# Patient Record
Sex: MALE | Race: WHITE | Employment: OTHER | URBAN - METROPOLITAN AREA
[De-identification: names, ages, dates, MRNs, and addresses within clinical notes are randomized per-mention and may not be internally consistent; named-entity substitution may affect disease eponyms.]

---

## 2017-03-18 PROBLEM — E11.9 DMII (DIABETES MELLITUS, TYPE 2) (HCC): Status: ACTIVE | Noted: 2017-03-18

## 2017-03-18 PROBLEM — G47.33 OSA (OBSTRUCTIVE SLEEP APNEA): Status: ACTIVE | Noted: 2017-03-18

## 2017-03-18 PROBLEM — R06.02 SOB (SHORTNESS OF BREATH): Status: ACTIVE | Noted: 2017-03-18

## 2017-03-18 PROBLEM — R77.8 ELEVATED TROPONIN: Status: ACTIVE | Noted: 2017-03-18

## 2017-03-19 PROBLEM — J44.1 COPD EXACERBATION (HCC): Status: ACTIVE | Noted: 2017-03-19

## 2017-03-19 PROBLEM — R07.2 PRECORDIAL PAIN: Status: ACTIVE | Noted: 2017-03-19

## 2017-03-19 PROBLEM — G47.33 OSA TREATED WITH BIPAP: Status: ACTIVE | Noted: 2017-03-19

## 2017-03-20 PROBLEM — R07.2 PRECORDIAL PAIN: Status: RESOLVED | Noted: 2017-03-19 | Resolved: 2017-03-20

## 2017-03-22 ENCOUNTER — TELEPHONE (OUTPATIENT)
Dept: CARDIOLOGY CLINIC | Age: 57
End: 2017-03-22

## 2017-03-23 ENCOUNTER — TELEPHONE (OUTPATIENT)
Dept: CARDIOLOGY CLINIC | Age: 57
End: 2017-03-23

## 2017-03-31 ENCOUNTER — OFFICE VISIT (OUTPATIENT)
Dept: CARDIOLOGY CLINIC | Age: 57
End: 2017-03-31

## 2017-03-31 VITALS
OXYGEN SATURATION: 93 % | HEART RATE: 103 BPM | WEIGHT: 315 LBS | SYSTOLIC BLOOD PRESSURE: 110 MMHG | BODY MASS INDEX: 46.65 KG/M2 | DIASTOLIC BLOOD PRESSURE: 80 MMHG | HEIGHT: 69 IN

## 2017-03-31 DIAGNOSIS — E66.01 MORBID OBESITY WITH BMI OF 60.0-69.9, ADULT (HCC): ICD-10-CM

## 2017-03-31 DIAGNOSIS — R06.02 SOB (SHORTNESS OF BREATH): ICD-10-CM

## 2017-03-31 DIAGNOSIS — I10 ESSENTIAL HYPERTENSION: ICD-10-CM

## 2017-03-31 DIAGNOSIS — I25.10 CORONARY ARTERY DISEASE INVOLVING NATIVE CORONARY ARTERY OF NATIVE HEART WITHOUT ANGINA PECTORIS: Primary | ICD-10-CM

## 2017-03-31 DIAGNOSIS — I50.32 CHRONIC DIASTOLIC HF (HEART FAILURE) (HCC): ICD-10-CM

## 2017-03-31 DIAGNOSIS — I48.20 CHRONIC A-FIB (HCC): ICD-10-CM

## 2017-03-31 DIAGNOSIS — I25.2 HX OF NON-ST ELEVATION MYOCARDIAL INFARCTION (NSTEMI): ICD-10-CM

## 2017-03-31 PROCEDURE — 1036F TOBACCO NON-USER: CPT | Performed by: INTERNAL MEDICINE

## 2017-03-31 PROCEDURE — 3017F COLORECTAL CA SCREEN DOC REV: CPT | Performed by: INTERNAL MEDICINE

## 2017-03-31 PROCEDURE — G8427 DOCREV CUR MEDS BY ELIG CLIN: HCPCS | Performed by: INTERNAL MEDICINE

## 2017-03-31 PROCEDURE — G8598 ASA/ANTIPLAT THER USED: HCPCS | Performed by: INTERNAL MEDICINE

## 2017-03-31 PROCEDURE — 93000 ELECTROCARDIOGRAM COMPLETE: CPT | Performed by: INTERNAL MEDICINE

## 2017-03-31 PROCEDURE — 99214 OFFICE O/P EST MOD 30 MIN: CPT | Performed by: INTERNAL MEDICINE

## 2017-03-31 PROCEDURE — 1111F DSCHRG MED/CURRENT MED MERGE: CPT | Performed by: INTERNAL MEDICINE

## 2017-03-31 PROCEDURE — G8484 FLU IMMUNIZE NO ADMIN: HCPCS | Performed by: INTERNAL MEDICINE

## 2017-03-31 PROCEDURE — G8417 CALC BMI ABV UP PARAM F/U: HCPCS | Performed by: INTERNAL MEDICINE

## 2017-06-02 ENCOUNTER — OFFICE VISIT (OUTPATIENT)
Dept: CARDIOLOGY CLINIC | Age: 57
End: 2017-06-02

## 2017-06-02 VITALS
DIASTOLIC BLOOD PRESSURE: 70 MMHG | OXYGEN SATURATION: 93 % | HEART RATE: 90 BPM | BODY MASS INDEX: 46.65 KG/M2 | WEIGHT: 315 LBS | SYSTOLIC BLOOD PRESSURE: 120 MMHG | HEIGHT: 69 IN

## 2017-06-02 DIAGNOSIS — I50.32 CHRONIC DIASTOLIC HEART FAILURE (HCC): ICD-10-CM

## 2017-06-02 DIAGNOSIS — I25.10 CORONARY ARTERY DISEASE INVOLVING NATIVE CORONARY ARTERY OF NATIVE HEART WITHOUT ANGINA PECTORIS: Chronic | ICD-10-CM

## 2017-06-02 DIAGNOSIS — I48.20 CHRONIC ATRIAL FIBRILLATION (HCC): Primary | ICD-10-CM

## 2017-06-02 DIAGNOSIS — E66.01 MORBID OBESITY WITH BMI OF 60.0-69.9, ADULT (HCC): Chronic | ICD-10-CM

## 2017-06-02 DIAGNOSIS — I10 ESSENTIAL HYPERTENSION: ICD-10-CM

## 2017-06-02 PROCEDURE — 99214 OFFICE O/P EST MOD 30 MIN: CPT | Performed by: INTERNAL MEDICINE

## 2017-06-02 PROCEDURE — G8598 ASA/ANTIPLAT THER USED: HCPCS | Performed by: INTERNAL MEDICINE

## 2017-06-02 PROCEDURE — G8427 DOCREV CUR MEDS BY ELIG CLIN: HCPCS | Performed by: INTERNAL MEDICINE

## 2017-06-02 PROCEDURE — 3017F COLORECTAL CA SCREEN DOC REV: CPT | Performed by: INTERNAL MEDICINE

## 2017-06-02 PROCEDURE — 1036F TOBACCO NON-USER: CPT | Performed by: INTERNAL MEDICINE

## 2017-06-02 PROCEDURE — G8417 CALC BMI ABV UP PARAM F/U: HCPCS | Performed by: INTERNAL MEDICINE

## 2017-06-02 RX ORDER — ATORVASTATIN CALCIUM 40 MG/1
40 TABLET, FILM COATED ORAL DAILY
Qty: 30 TABLET | Refills: 6 | Status: SHIPPED | OUTPATIENT
Start: 2017-06-02 | End: 2018-01-01

## 2018-01-01 ENCOUNTER — OFFICE VISIT (OUTPATIENT)
Dept: CARDIOLOGY CLINIC | Age: 58
End: 2018-01-01

## 2018-01-01 VITALS
DIASTOLIC BLOOD PRESSURE: 78 MMHG | SYSTOLIC BLOOD PRESSURE: 122 MMHG | BODY MASS INDEX: 44.1 KG/M2 | WEIGHT: 315 LBS | OXYGEN SATURATION: 90 % | HEIGHT: 71 IN | HEART RATE: 78 BPM

## 2018-01-01 DIAGNOSIS — E66.01 MORBID OBESITY WITH BMI OF 50.0-59.9, ADULT (HCC): ICD-10-CM

## 2018-01-01 DIAGNOSIS — I48.20 CHRONIC ATRIAL FIBRILLATION (HCC): ICD-10-CM

## 2018-01-01 DIAGNOSIS — I50.32 CHRONIC DIASTOLIC HEART FAILURE (HCC): Chronic | ICD-10-CM

## 2018-01-01 DIAGNOSIS — I10 ESSENTIAL HYPERTENSION: ICD-10-CM

## 2018-01-01 DIAGNOSIS — I25.10 CORONARY ARTERY DISEASE INVOLVING NATIVE CORONARY ARTERY OF NATIVE HEART WITHOUT ANGINA PECTORIS: Primary | Chronic | ICD-10-CM

## 2018-01-01 PROCEDURE — 1036F TOBACCO NON-USER: CPT | Performed by: INTERNAL MEDICINE

## 2018-01-01 PROCEDURE — 99214 OFFICE O/P EST MOD 30 MIN: CPT | Performed by: INTERNAL MEDICINE

## 2018-01-01 PROCEDURE — G8598 ASA/ANTIPLAT THER USED: HCPCS | Performed by: INTERNAL MEDICINE

## 2018-01-01 PROCEDURE — 3017F COLORECTAL CA SCREEN DOC REV: CPT | Performed by: INTERNAL MEDICINE

## 2018-01-01 PROCEDURE — G8428 CUR MEDS NOT DOCUMENT: HCPCS | Performed by: INTERNAL MEDICINE

## 2018-01-01 PROCEDURE — G8417 CALC BMI ABV UP PARAM F/U: HCPCS | Performed by: INTERNAL MEDICINE

## 2018-01-01 RX ORDER — GABAPENTIN 300 MG/1
100 CAPSULE ORAL NIGHTLY PRN
Status: ON HOLD | COMMUNITY
End: 2019-01-01 | Stop reason: HOSPADM

## 2018-01-01 RX ORDER — IPRATROPIUM BROMIDE AND ALBUTEROL SULFATE 2.5; .5 MG/3ML; MG/3ML
1 SOLUTION RESPIRATORY (INHALATION) EVERY 4 HOURS
COMMUNITY

## 2018-01-01 RX ORDER — TORSEMIDE 100 MG/1
100 TABLET ORAL DAILY
COMMUNITY
End: 2019-01-01

## 2018-01-01 RX ORDER — VITAMIN B COMPLEX
1 CAPSULE ORAL DAILY
COMMUNITY
End: 2019-01-01

## 2018-01-01 RX ORDER — DILTIAZEM HYDROCHLORIDE 240 MG/1
240 CAPSULE, EXTENDED RELEASE ORAL DAILY
COMMUNITY
End: 2019-01-01

## 2018-01-01 RX ORDER — ATORVASTATIN CALCIUM 40 MG/1
40 TABLET, FILM COATED ORAL DAILY
Qty: 90 TABLET | Refills: 3 | Status: ON HOLD | OUTPATIENT
Start: 2018-01-01 | End: 2019-01-01 | Stop reason: HOSPADM

## 2018-05-29 RX ORDER — ATORVASTATIN CALCIUM 40 MG/1
TABLET, FILM COATED ORAL
Qty: 30 TABLET | Refills: 6 | OUTPATIENT
Start: 2018-05-29

## 2018-08-29 NOTE — PROGRESS NOTES
release capsule Take 240 mg by mouth daily      b complex vitamins capsule Take 1 capsule by mouth daily      ipratropium-albuterol (DUONEB) 0.5-2.5 (3) MG/3ML SOLN nebulizer solution Inhale 1 vial into the lungs every 4 hours      gabapentin (NEURONTIN) 300 MG capsule Take 300 mg by mouth nightly as needed. Maylon Newman aspirin 81 MG EC tablet Take 1 tablet by mouth daily 30 tablet 3    digoxin (LANOXIN) 125 MCG tablet Take 125 mcg by mouth daily      rivaroxaban (XARELTO) 20 MG TABS tablet Take 20 mg by mouth daily      budesonide-formoterol (SYMBICORT) 160-4.5 MCG/ACT AERO Inhale 2 puffs into the lungs 2 times daily      albuterol (PROVENTIL HFA;VENTOLIN HFA) 108 (90 BASE) MCG/ACT inhaler Inhale 2 puffs into the lungs every 4 hours as needed for Wheezing.  allopurinol (ZYLOPRIM) 300 MG tablet Take 300 mg by mouth daily.  metFORMIN (GLUCOPHAGE) 500 MG tablet Take 500 mg by mouth daily (with breakfast). No current facility-administered medications for this visit. Review of Systems:  · Constitutional: no unanticipated weight loss. There's been no change in energy level, sleep pattern, or activity level. No fevers, chills. · Eyes: No visual changes or diplopia. No scleral icterus. · ENT: No Headaches, hearing loss or vertigo. No mouth sores or sore throat. · Cardiovascular: as reviewed in HPI  · Respiratory: No cough or wheezing, no sputum production. No hematemesis. · Gastrointestinal: No abdominal pain, appetite loss, blood in stools. No change in bowel or bladder habits. +obesity  · Genitourinary: No dysuria, trouble voiding, or hematuria. · Musculoskeletal:  No gait disturbance, no joint complaints. · Integumentary: No rash or pruritis. · Neurological: No headache, diplopia, change in muscle strength, numbness or tingling. · Psychiatric: No anxiety or depression. · Endocrine: No temperature intolerance. No excessive thirst, fluid intake, or urination.  No

## 2018-09-07 NOTE — COMMUNICATION BODY
Assessment:       Plan:     HPI:  The patient is 62 y.o. male with a past medical history significant for CAD s/p RAMANA to LAD, morbid obesity, ELODIA, atrial fibrillation, fall, COPD, and diastolic heart failure presents for chronic management of his CAD. He was originally seen during hospitalization 3/2017 when he was admitted for shortness of breath and chest tightness. He underwent angiography as he also had a troponin leak at 0.16. He had severe LAD stenosis and underwent placement of 2 RAMANA. Today he is here with his wife. He is using a wheelchair. He denies chest pain, worsening dyspnea, worsening leg swelling, lightheadedness, syncope and palpitations. He reports compliance with CPAP and his medications. He denies unusual bleeding. He was on a statin \"holiday\" to see if it improved his leg weakness but he denies any changes. Assessment/Plan:     1) CAD s/p 2 RAMANA to LAD after NSTEMI. Continue with medical management and risk factor modification including aspirin. Plavix stopped with >1 year of therapy. Advised to resume atorvastatin 40mg daily. 2) Chronic atrial fibrillation. Continue Xarelto. Rate controlled currently with CCB. Will stop digoxin. Patient and pt will monitor pulse and call if heart rate consistently >100. Recommend increasing CCB dosage for rate control if needed.    3) Dyspnea. Etiology multifactorial with morbid obesity, deconditioning, and diastolic heart failure. 4) Chronic diastolic heart failure. LVEDP elevated >30 mmHg on angiogram. Continue po diuretics.    5) Essential hypertension. Controlled. Goal BP <140/90 with DM. Continue medical therapy.    6) Morbid obesity. BMI 58. Encouraged weight loss. Discussed that his weight increases his risk of complications.      Follow up in 6 months. Thank you very much for allowing me to participate in the care of your patient. Please do not hesitate to contact me if you have any questions. Sincerely,  Helen Cheung.  Dameon MD [facility], [address] [place] [address]  Ph: [phone]  Fax: [phone]    Physician Attestation: The scribes documentation has been prepared under my direction and personally reviewed by me it its entirety. I confirm that the note above accurately reflects all work, treatment, procedures, and medical decision making performed by me.

## 2018-09-26 PROBLEM — R77.8 ELEVATED TROPONIN: Status: RESOLVED | Noted: 2017-03-18 | Resolved: 2018-01-01

## 2019-01-01 ENCOUNTER — APPOINTMENT (OUTPATIENT)
Dept: GENERAL RADIOLOGY | Age: 59
DRG: 870 | End: 2019-01-01
Payer: MEDICARE

## 2019-01-01 ENCOUNTER — PREP FOR PROCEDURE (OUTPATIENT)
Dept: OPHTHALMOLOGY | Age: 59
End: 2019-01-01

## 2019-01-01 ENCOUNTER — HOSPITAL ENCOUNTER (INPATIENT)
Age: 59
LOS: 13 days | Discharge: HOSPICE | DRG: 870 | End: 2019-06-16
Attending: EMERGENCY MEDICINE | Admitting: INTERNAL MEDICINE
Payer: MEDICARE

## 2019-01-01 VITALS
HEART RATE: 112 BPM | TEMPERATURE: 97.3 F | SYSTOLIC BLOOD PRESSURE: 96 MMHG | HEIGHT: 69 IN | OXYGEN SATURATION: 95 % | WEIGHT: 315 LBS | BODY MASS INDEX: 46.65 KG/M2 | RESPIRATION RATE: 18 BRPM | DIASTOLIC BLOOD PRESSURE: 65 MMHG

## 2019-01-01 DIAGNOSIS — R65.21 SEPTIC SHOCK (HCC): Primary | ICD-10-CM

## 2019-01-01 DIAGNOSIS — E87.20 LACTIC ACIDOSIS: ICD-10-CM

## 2019-01-01 DIAGNOSIS — A41.9 SEPTIC SHOCK (HCC): Primary | ICD-10-CM

## 2019-01-01 LAB
A/G RATIO: 0.5 (ref 1.1–2.2)
A/G RATIO: 0.6 (ref 1.1–2.2)
A/G RATIO: 0.7 (ref 1.1–2.2)
A/G RATIO: 0.7 (ref 1.1–2.2)
A/G RATIO: 0.8 (ref 1.1–2.2)
ABO/RH: NORMAL
ALBUMIN SERPL-MCNC: 2.2 G/DL (ref 3.4–5)
ALBUMIN SERPL-MCNC: 2.3 G/DL (ref 3.4–5)
ALBUMIN SERPL-MCNC: 2.4 G/DL (ref 3.4–5)
ALBUMIN SERPL-MCNC: 2.4 G/DL (ref 3.4–5)
ALBUMIN SERPL-MCNC: 2.5 G/DL (ref 3.4–5)
ALBUMIN SERPL-MCNC: 2.5 G/DL (ref 3.4–5)
ALBUMIN SERPL-MCNC: 2.6 G/DL (ref 3.4–5)
ALBUMIN SERPL-MCNC: 2.7 G/DL (ref 3.4–5)
ALBUMIN SERPL-MCNC: 2.8 G/DL (ref 3.4–5)
ALBUMIN SERPL-MCNC: 2.9 G/DL (ref 3.4–5)
ALBUMIN SERPL-MCNC: 3 G/DL (ref 3.4–5)
ALP BLD-CCNC: 212 U/L (ref 40–129)
ALP BLD-CCNC: 221 U/L (ref 40–129)
ALP BLD-CCNC: 249 U/L (ref 40–129)
ALP BLD-CCNC: 262 U/L (ref 40–129)
ALP BLD-CCNC: 275 U/L (ref 40–129)
ALT SERPL-CCNC: 13 U/L (ref 10–40)
ALT SERPL-CCNC: 19 U/L (ref 10–40)
ALT SERPL-CCNC: 21 U/L (ref 10–40)
ALT SERPL-CCNC: 29 U/L (ref 10–40)
ALT SERPL-CCNC: 30 U/L (ref 10–40)
ANION GAP SERPL CALCULATED.3IONS-SCNC: 10 MMOL/L (ref 3–16)
ANION GAP SERPL CALCULATED.3IONS-SCNC: 11 MMOL/L (ref 3–16)
ANION GAP SERPL CALCULATED.3IONS-SCNC: 12 MMOL/L (ref 3–16)
ANION GAP SERPL CALCULATED.3IONS-SCNC: 13 MMOL/L (ref 3–16)
ANION GAP SERPL CALCULATED.3IONS-SCNC: 13 MMOL/L (ref 3–16)
ANION GAP SERPL CALCULATED.3IONS-SCNC: 14 MMOL/L (ref 3–16)
ANION GAP SERPL CALCULATED.3IONS-SCNC: 15 MMOL/L (ref 3–16)
ANION GAP SERPL CALCULATED.3IONS-SCNC: 15 MMOL/L (ref 3–16)
ANION GAP SERPL CALCULATED.3IONS-SCNC: 16 MMOL/L (ref 3–16)
ANION GAP SERPL CALCULATED.3IONS-SCNC: 16 MMOL/L (ref 3–16)
ANION GAP SERPL CALCULATED.3IONS-SCNC: 17 MMOL/L (ref 3–16)
ANION GAP SERPL CALCULATED.3IONS-SCNC: 21 MMOL/L (ref 3–16)
ANION GAP SERPL CALCULATED.3IONS-SCNC: 8 MMOL/L (ref 3–16)
ANION GAP SERPL CALCULATED.3IONS-SCNC: 8 MMOL/L (ref 3–16)
ANISOCYTOSIS: ABNORMAL
ANTIBODY SCREEN: NORMAL
APTT: 67.2 SEC (ref 26–36)
AST SERPL-CCNC: 22 U/L (ref 15–37)
AST SERPL-CCNC: 30 U/L (ref 15–37)
AST SERPL-CCNC: 33 U/L (ref 15–37)
AST SERPL-CCNC: 42 U/L (ref 15–37)
AST SERPL-CCNC: 45 U/L (ref 15–37)
BANDED NEUTROPHILS RELATIVE PERCENT: 1 % (ref 0–7)
BANDED NEUTROPHILS RELATIVE PERCENT: 2 % (ref 0–7)
BANDED NEUTROPHILS RELATIVE PERCENT: 3 % (ref 0–7)
BANDED NEUTROPHILS RELATIVE PERCENT: 3 % (ref 0–7)
BANDED NEUTROPHILS RELATIVE PERCENT: 4 % (ref 0–7)
BASE EXCESS ARTERIAL: -2.1 MMOL/L (ref -3–3)
BASE EXCESS ARTERIAL: -3.1 MMOL/L (ref -3–3)
BASE EXCESS ARTERIAL: -5 (ref -3–3)
BASOPHILS ABSOLUTE: 0 K/UL (ref 0–0.2)
BASOPHILS RELATIVE PERCENT: 0 %
BASOPHILS RELATIVE PERCENT: 0.1 %
BASOPHILS RELATIVE PERCENT: 0.3 %
BILIRUB SERPL-MCNC: 0.8 MG/DL (ref 0–1)
BILIRUB SERPL-MCNC: 0.8 MG/DL (ref 0–1)
BILIRUB SERPL-MCNC: 1 MG/DL (ref 0–1)
BILIRUB SERPL-MCNC: 1.6 MG/DL (ref 0–1)
BILIRUB SERPL-MCNC: 2 MG/DL (ref 0–1)
BLOOD BANK DISPENSE STATUS: NORMAL
BLOOD BANK DISPENSE STATUS: NORMAL
BLOOD BANK PRODUCT CODE: NORMAL
BLOOD BANK PRODUCT CODE: NORMAL
BLOOD CULTURE, ROUTINE: NORMAL
BLOOD CULTURE, ROUTINE: NORMAL
BPU ID: NORMAL
BPU ID: NORMAL
BUN BLDV-MCNC: 11 MG/DL (ref 7–20)
BUN BLDV-MCNC: 12 MG/DL (ref 7–20)
BUN BLDV-MCNC: 13 MG/DL (ref 7–20)
BUN BLDV-MCNC: 14 MG/DL (ref 7–20)
BUN BLDV-MCNC: 14 MG/DL (ref 7–20)
BUN BLDV-MCNC: 17 MG/DL (ref 7–20)
BUN BLDV-MCNC: 17 MG/DL (ref 7–20)
BUN BLDV-MCNC: 18 MG/DL (ref 7–20)
BUN BLDV-MCNC: 21 MG/DL (ref 7–20)
BUN BLDV-MCNC: 21 MG/DL (ref 7–20)
BUN BLDV-MCNC: 23 MG/DL (ref 7–20)
BUN BLDV-MCNC: 26 MG/DL (ref 7–20)
BUN BLDV-MCNC: 26 MG/DL (ref 7–20)
BUN BLDV-MCNC: 29 MG/DL (ref 7–20)
BUN BLDV-MCNC: 33 MG/DL (ref 7–20)
BUN BLDV-MCNC: 34 MG/DL (ref 7–20)
BUN BLDV-MCNC: 37 MG/DL (ref 7–20)
BUN BLDV-MCNC: 43 MG/DL (ref 7–20)
BUN BLDV-MCNC: 47 MG/DL (ref 7–20)
BUN BLDV-MCNC: 52 MG/DL (ref 7–20)
BUN BLDV-MCNC: 58 MG/DL (ref 7–20)
BUN BLDV-MCNC: 9 MG/DL (ref 7–20)
CALCIUM IONIZED: 1.05 MMOL/L (ref 1.12–1.32)
CALCIUM IONIZED: 1.07 MMOL/L (ref 1.12–1.32)
CALCIUM IONIZED: 1.08 MMOL/L (ref 1.12–1.32)
CALCIUM IONIZED: 1.08 MMOL/L (ref 1.12–1.32)
CALCIUM IONIZED: 1.09 MMOL/L (ref 1.12–1.32)
CALCIUM IONIZED: 1.09 MMOL/L (ref 1.12–1.32)
CALCIUM IONIZED: 1.1 MMOL/L (ref 1.12–1.32)
CALCIUM IONIZED: 1.11 MMOL/L (ref 1.12–1.32)
CALCIUM IONIZED: 1.11 MMOL/L (ref 1.12–1.32)
CALCIUM IONIZED: 1.12 MMOL/L (ref 1.12–1.32)
CALCIUM IONIZED: 1.12 MMOL/L (ref 1.12–1.32)
CALCIUM SERPL-MCNC: 7.7 MG/DL (ref 8.3–10.6)
CALCIUM SERPL-MCNC: 7.9 MG/DL (ref 8.3–10.6)
CALCIUM SERPL-MCNC: 8.3 MG/DL (ref 8.3–10.6)
CALCIUM SERPL-MCNC: 8.4 MG/DL (ref 8.3–10.6)
CALCIUM SERPL-MCNC: 8.5 MG/DL (ref 8.3–10.6)
CALCIUM SERPL-MCNC: 8.5 MG/DL (ref 8.3–10.6)
CALCIUM SERPL-MCNC: 8.6 MG/DL (ref 8.3–10.6)
CALCIUM SERPL-MCNC: 8.7 MG/DL (ref 8.3–10.6)
CALCIUM SERPL-MCNC: 8.7 MG/DL (ref 8.3–10.6)
CALCIUM SERPL-MCNC: 8.8 MG/DL (ref 8.3–10.6)
CALCIUM SERPL-MCNC: 9 MG/DL (ref 8.3–10.6)
CALCIUM SERPL-MCNC: 9.1 MG/DL (ref 8.3–10.6)
CALCIUM SERPL-MCNC: 9.2 MG/DL (ref 8.3–10.6)
CALCIUM SERPL-MCNC: 9.3 MG/DL (ref 8.3–10.6)
CALCIUM SERPL-MCNC: 9.4 MG/DL (ref 8.3–10.6)
CALCIUM SERPL-MCNC: 9.6 MG/DL (ref 8.3–10.6)
CALCIUM SERPL-MCNC: 9.6 MG/DL (ref 8.3–10.6)
CALCIUM SERPL-MCNC: 9.7 MG/DL (ref 8.3–10.6)
CARBOXYHEMOGLOBIN ARTERIAL: 0.6 % (ref 0–1.5)
CARBOXYHEMOGLOBIN ARTERIAL: 0.6 % (ref 0–1.5)
CHLORIDE BLD-SCNC: 100 MMOL/L (ref 99–110)
CHLORIDE BLD-SCNC: 101 MMOL/L (ref 99–110)
CHLORIDE BLD-SCNC: 102 MMOL/L (ref 99–110)
CHLORIDE BLD-SCNC: 87 MMOL/L (ref 99–110)
CHLORIDE BLD-SCNC: 89 MMOL/L (ref 99–110)
CHLORIDE BLD-SCNC: 91 MMOL/L (ref 99–110)
CHLORIDE BLD-SCNC: 91 MMOL/L (ref 99–110)
CHLORIDE BLD-SCNC: 92 MMOL/L (ref 99–110)
CHLORIDE BLD-SCNC: 92 MMOL/L (ref 99–110)
CHLORIDE BLD-SCNC: 93 MMOL/L (ref 99–110)
CHLORIDE BLD-SCNC: 94 MMOL/L (ref 99–110)
CHLORIDE BLD-SCNC: 94 MMOL/L (ref 99–110)
CHLORIDE BLD-SCNC: 95 MMOL/L (ref 99–110)
CHLORIDE BLD-SCNC: 96 MMOL/L (ref 99–110)
CHLORIDE BLD-SCNC: 97 MMOL/L (ref 99–110)
CHLORIDE BLD-SCNC: 98 MMOL/L (ref 99–110)
CHLORIDE BLD-SCNC: 98 MMOL/L (ref 99–110)
CHLORIDE BLD-SCNC: 99 MMOL/L (ref 99–110)
CHLORIDE BLD-SCNC: 99 MMOL/L (ref 99–110)
CO2: 21 MMOL/L (ref 21–32)
CO2: 21 MMOL/L (ref 21–32)
CO2: 22 MMOL/L (ref 21–32)
CO2: 23 MMOL/L (ref 21–32)
CO2: 24 MMOL/L (ref 21–32)
CO2: 25 MMOL/L (ref 21–32)
CO2: 26 MMOL/L (ref 21–32)
CO2: 26 MMOL/L (ref 21–32)
CO2: 27 MMOL/L (ref 21–32)
CREAT SERPL-MCNC: 0.7 MG/DL (ref 0.9–1.3)
CREAT SERPL-MCNC: 0.9 MG/DL (ref 0.9–1.3)
CREAT SERPL-MCNC: 0.9 MG/DL (ref 0.9–1.3)
CREAT SERPL-MCNC: 1 MG/DL (ref 0.9–1.3)
CREAT SERPL-MCNC: 1.1 MG/DL (ref 0.9–1.3)
CREAT SERPL-MCNC: 1.4 MG/DL (ref 0.9–1.3)
CREAT SERPL-MCNC: 1.8 MG/DL (ref 0.9–1.3)
CREAT SERPL-MCNC: 1.9 MG/DL (ref 0.9–1.3)
CREAT SERPL-MCNC: 2.6 MG/DL (ref 0.9–1.3)
CREAT SERPL-MCNC: 2.8 MG/DL (ref 0.9–1.3)
CREAT SERPL-MCNC: 2.8 MG/DL (ref 0.9–1.3)
CREAT SERPL-MCNC: 3.2 MG/DL (ref 0.9–1.3)
CREAT SERPL-MCNC: 3.3 MG/DL (ref 0.9–1.3)
CREAT SERPL-MCNC: 3.4 MG/DL (ref 0.9–1.3)
CREAT SERPL-MCNC: 3.5 MG/DL (ref 0.9–1.3)
CREAT SERPL-MCNC: 3.9 MG/DL (ref 0.9–1.3)
CREAT SERPL-MCNC: 4.4 MG/DL (ref 0.9–1.3)
CREAT SERPL-MCNC: 4.5 MG/DL (ref 0.9–1.3)
CREAT SERPL-MCNC: 4.7 MG/DL (ref 0.9–1.3)
CULTURE, BLOOD 2: NORMAL
CULTURE, RESPIRATORY: ABNORMAL
DESCRIPTION BLOOD BANK: NORMAL
DESCRIPTION BLOOD BANK: NORMAL
EKG ATRIAL RATE: 101 BPM
EKG ATRIAL RATE: 112 BPM
EKG ATRIAL RATE: 117 BPM
EKG DIAGNOSIS: NORMAL
EKG Q-T INTERVAL: 294 MS
EKG Q-T INTERVAL: 344 MS
EKG Q-T INTERVAL: 412 MS
EKG QRS DURATION: 154 MS
EKG QRS DURATION: 156 MS
EKG QRS DURATION: 88 MS
EKG QTC CALCULATION (BAZETT): 401 MS
EKG QTC CALCULATION (BAZETT): 496 MS
EKG QTC CALCULATION (BAZETT): 544 MS
EKG R AXIS: 33 DEGREES
EKG R AXIS: 35 DEGREES
EKG R AXIS: 56 DEGREES
EKG T AXIS: 107 DEGREES
EKG T AXIS: 204 DEGREES
EKG T AXIS: 235 DEGREES
EKG VENTRICULAR RATE: 105 BPM
EKG VENTRICULAR RATE: 112 BPM
EKG VENTRICULAR RATE: 125 BPM
EOSINOPHILS ABSOLUTE: 0 K/UL (ref 0–0.6)
EOSINOPHILS ABSOLUTE: 0.1 K/UL (ref 0–0.6)
EOSINOPHILS ABSOLUTE: 0.1 K/UL (ref 0–0.6)
EOSINOPHILS ABSOLUTE: 0.2 K/UL (ref 0–0.6)
EOSINOPHILS ABSOLUTE: 0.2 K/UL (ref 0–0.6)
EOSINOPHILS ABSOLUTE: 0.3 K/UL (ref 0–0.6)
EOSINOPHILS ABSOLUTE: 0.3 K/UL (ref 0–0.6)
EOSINOPHILS RELATIVE PERCENT: 0 %
EOSINOPHILS RELATIVE PERCENT: 0.1 %
EOSINOPHILS RELATIVE PERCENT: 0.1 %
EOSINOPHILS RELATIVE PERCENT: 1 %
EOSINOPHILS RELATIVE PERCENT: 2 %
EOSINOPHILS RELATIVE PERCENT: 3 %
ESTIMATED AVERAGE GLUCOSE: 82.5 MG/DL
GFR AFRICAN AMERICAN: 15
GFR AFRICAN AMERICAN: 16
GFR AFRICAN AMERICAN: 17
GFR AFRICAN AMERICAN: 19
GFR AFRICAN AMERICAN: 22
GFR AFRICAN AMERICAN: 23
GFR AFRICAN AMERICAN: 23
GFR AFRICAN AMERICAN: 24
GFR AFRICAN AMERICAN: 28
GFR AFRICAN AMERICAN: 28
GFR AFRICAN AMERICAN: 31
GFR AFRICAN AMERICAN: 44
GFR AFRICAN AMERICAN: 47
GFR AFRICAN AMERICAN: >60
GFR NON-AFRICAN AMERICAN: 13
GFR NON-AFRICAN AMERICAN: 13
GFR NON-AFRICAN AMERICAN: 14
GFR NON-AFRICAN AMERICAN: 16
GFR NON-AFRICAN AMERICAN: 18
GFR NON-AFRICAN AMERICAN: 19
GFR NON-AFRICAN AMERICAN: 19
GFR NON-AFRICAN AMERICAN: 20
GFR NON-AFRICAN AMERICAN: 23
GFR NON-AFRICAN AMERICAN: 23
GFR NON-AFRICAN AMERICAN: 25
GFR NON-AFRICAN AMERICAN: 36
GFR NON-AFRICAN AMERICAN: 39
GFR NON-AFRICAN AMERICAN: 52
GFR NON-AFRICAN AMERICAN: >60
GLOBULIN: 3.5 G/DL
GLOBULIN: 3.7 G/DL
GLOBULIN: 3.8 G/DL
GLOBULIN: 4 G/DL
GLOBULIN: 4.8 G/DL
GLUCOSE BLD-MCNC: 100 MG/DL (ref 70–99)
GLUCOSE BLD-MCNC: 104 MG/DL (ref 70–99)
GLUCOSE BLD-MCNC: 108 MG/DL (ref 70–99)
GLUCOSE BLD-MCNC: 109 MG/DL (ref 70–99)
GLUCOSE BLD-MCNC: 110 MG/DL (ref 70–99)
GLUCOSE BLD-MCNC: 116 MG/DL (ref 70–99)
GLUCOSE BLD-MCNC: 117 MG/DL (ref 70–99)
GLUCOSE BLD-MCNC: 118 MG/DL (ref 70–99)
GLUCOSE BLD-MCNC: 119 MG/DL (ref 70–99)
GLUCOSE BLD-MCNC: 119 MG/DL (ref 70–99)
GLUCOSE BLD-MCNC: 121 MG/DL (ref 70–99)
GLUCOSE BLD-MCNC: 126 MG/DL (ref 70–99)
GLUCOSE BLD-MCNC: 127 MG/DL (ref 70–99)
GLUCOSE BLD-MCNC: 128 MG/DL (ref 70–99)
GLUCOSE BLD-MCNC: 128 MG/DL (ref 70–99)
GLUCOSE BLD-MCNC: 131 MG/DL (ref 70–99)
GLUCOSE BLD-MCNC: 134 MG/DL (ref 70–99)
GLUCOSE BLD-MCNC: 135 MG/DL (ref 70–99)
GLUCOSE BLD-MCNC: 137 MG/DL (ref 70–99)
GLUCOSE BLD-MCNC: 138 MG/DL (ref 70–99)
GLUCOSE BLD-MCNC: 139 MG/DL (ref 70–99)
GLUCOSE BLD-MCNC: 141 MG/DL (ref 70–99)
GLUCOSE BLD-MCNC: 143 MG/DL (ref 70–99)
GLUCOSE BLD-MCNC: 146 MG/DL (ref 70–99)
GLUCOSE BLD-MCNC: 149 MG/DL (ref 70–99)
GLUCOSE BLD-MCNC: 150 MG/DL (ref 70–99)
GLUCOSE BLD-MCNC: 151 MG/DL (ref 70–99)
GLUCOSE BLD-MCNC: 154 MG/DL (ref 70–99)
GLUCOSE BLD-MCNC: 157 MG/DL (ref 70–99)
GLUCOSE BLD-MCNC: 160 MG/DL (ref 70–99)
GLUCOSE BLD-MCNC: 160 MG/DL (ref 70–99)
GLUCOSE BLD-MCNC: 163 MG/DL (ref 70–99)
GLUCOSE BLD-MCNC: 164 MG/DL (ref 70–99)
GLUCOSE BLD-MCNC: 165 MG/DL (ref 70–99)
GLUCOSE BLD-MCNC: 167 MG/DL (ref 70–99)
GLUCOSE BLD-MCNC: 170 MG/DL (ref 70–99)
GLUCOSE BLD-MCNC: 171 MG/DL (ref 70–99)
GLUCOSE BLD-MCNC: 178 MG/DL (ref 70–99)
GLUCOSE BLD-MCNC: 178 MG/DL (ref 70–99)
GLUCOSE BLD-MCNC: 179 MG/DL (ref 70–99)
GLUCOSE BLD-MCNC: 180 MG/DL (ref 70–99)
GLUCOSE BLD-MCNC: 180 MG/DL (ref 70–99)
GLUCOSE BLD-MCNC: 182 MG/DL (ref 70–99)
GLUCOSE BLD-MCNC: 183 MG/DL (ref 70–99)
GLUCOSE BLD-MCNC: 185 MG/DL (ref 70–99)
GLUCOSE BLD-MCNC: 187 MG/DL (ref 70–99)
GLUCOSE BLD-MCNC: 188 MG/DL (ref 70–99)
GLUCOSE BLD-MCNC: 201 MG/DL (ref 70–99)
GLUCOSE BLD-MCNC: 203 MG/DL (ref 70–99)
GLUCOSE BLD-MCNC: 59 MG/DL (ref 70–99)
GLUCOSE BLD-MCNC: 66 MG/DL (ref 70–99)
GLUCOSE BLD-MCNC: 69 MG/DL (ref 70–99)
GLUCOSE BLD-MCNC: 73 MG/DL (ref 70–99)
GLUCOSE BLD-MCNC: 79 MG/DL (ref 70–99)
GLUCOSE BLD-MCNC: 80 MG/DL (ref 70–99)
GLUCOSE BLD-MCNC: 80 MG/DL (ref 70–99)
GLUCOSE BLD-MCNC: 81 MG/DL (ref 70–99)
GLUCOSE BLD-MCNC: 82 MG/DL (ref 70–99)
GLUCOSE BLD-MCNC: 82 MG/DL (ref 70–99)
GLUCOSE BLD-MCNC: 83 MG/DL (ref 70–99)
GLUCOSE BLD-MCNC: 84 MG/DL (ref 70–99)
GLUCOSE BLD-MCNC: 86 MG/DL (ref 70–99)
GLUCOSE BLD-MCNC: 86 MG/DL (ref 70–99)
GLUCOSE BLD-MCNC: 87 MG/DL (ref 70–99)
GLUCOSE BLD-MCNC: 87 MG/DL (ref 70–99)
GLUCOSE BLD-MCNC: 88 MG/DL (ref 70–99)
GLUCOSE BLD-MCNC: 90 MG/DL (ref 70–99)
GLUCOSE BLD-MCNC: 90 MG/DL (ref 70–99)
GLUCOSE BLD-MCNC: 91 MG/DL (ref 70–99)
GLUCOSE BLD-MCNC: 92 MG/DL (ref 70–99)
GLUCOSE BLD-MCNC: 92 MG/DL (ref 70–99)
GLUCOSE BLD-MCNC: 94 MG/DL (ref 70–99)
GLUCOSE BLD-MCNC: 95 MG/DL (ref 70–99)
GLUCOSE BLD-MCNC: 96 MG/DL (ref 70–99)
GLUCOSE BLD-MCNC: 98 MG/DL (ref 70–99)
GLUCOSE BLD-MCNC: 99 MG/DL (ref 70–99)
GRAM STAIN RESULT: ABNORMAL
GRAM STAIN RESULT: ABNORMAL
HBA1C MFR BLD: 4.5 %
HBV SURFACE AB TITR SER: <3.5 MIU/ML
HCO3 ARTERIAL: 19.9 MMOL/L (ref 21–29)
HCO3 ARTERIAL: 22.7 MMOL/L (ref 21–29)
HCO3 ARTERIAL: 22.8 MMOL/L (ref 21–29)
HCT VFR BLD CALC: 29.9 % (ref 40.5–52.5)
HCT VFR BLD CALC: 30.1 % (ref 40.5–52.5)
HCT VFR BLD CALC: 30.6 % (ref 40.5–52.5)
HCT VFR BLD CALC: 31.1 % (ref 40.5–52.5)
HCT VFR BLD CALC: 31.2 % (ref 40.5–52.5)
HCT VFR BLD CALC: 31.7 % (ref 40.5–52.5)
HCT VFR BLD CALC: 31.9 % (ref 40.5–52.5)
HCT VFR BLD CALC: 32.2 % (ref 40.5–52.5)
HCT VFR BLD CALC: 32.5 % (ref 40.5–52.5)
HCT VFR BLD CALC: 33.5 % (ref 40.5–52.5)
HCT VFR BLD CALC: 33.6 % (ref 40.5–52.5)
HCT VFR BLD CALC: 33.8 % (ref 40.5–52.5)
HCT VFR BLD CALC: 34.6 % (ref 40.5–52.5)
HCT VFR BLD CALC: 36.7 % (ref 40.5–52.5)
HEMATOLOGY PATH CONSULT: NO
HEMATOLOGY PATH CONSULT: NORMAL
HEMATOLOGY PATH CONSULT: YES
HEMOGLOBIN, ART, EXTENDED: 10.9 G/DL (ref 13.5–17.5)
HEMOGLOBIN, ART, EXTENDED: 10.9 G/DL (ref 13.5–17.5)
HEMOGLOBIN: 10 G/DL (ref 13.5–17.5)
HEMOGLOBIN: 11 G/DL (ref 13.5–17.5)
HEMOGLOBIN: 8.8 G/DL (ref 13.5–17.5)
HEMOGLOBIN: 8.9 G/DL (ref 13.5–17.5)
HEMOGLOBIN: 9 G/DL (ref 13.5–17.5)
HEMOGLOBIN: 9.1 G/DL (ref 13.5–17.5)
HEMOGLOBIN: 9.2 G/DL (ref 13.5–17.5)
HEMOGLOBIN: 9.3 G/DL (ref 13.5–17.5)
HEMOGLOBIN: 9.5 G/DL (ref 13.5–17.5)
HEMOGLOBIN: 9.7 G/DL (ref 13.5–17.5)
HEMOGLOBIN: 9.7 G/DL (ref 13.5–17.5)
HEMOGLOBIN: 9.9 G/DL (ref 13.5–17.5)
HEPATITIS B CORE TOTAL ANTIBODY: NEGATIVE
HEPATITIS B SURFACE ANTIGEN INTERPRETATION: NORMAL
HYPOCHROMIA: ABNORMAL
INR BLD: 1.32 (ref 0.86–1.14)
INR BLD: 1.34 (ref 0.86–1.14)
INR BLD: 1.45 (ref 0.86–1.14)
INR BLD: 1.49 (ref 0.86–1.14)
INR BLD: 1.55 (ref 0.86–1.14)
INR BLD: 1.61 (ref 0.86–1.14)
INR BLD: 1.76 (ref 0.86–1.14)
INR BLD: 1.86 (ref 0.86–1.14)
INR BLD: 1.89 (ref 0.86–1.14)
INR BLD: 1.93 (ref 0.86–1.14)
INR BLD: 2.15 (ref 0.86–1.14)
INR BLD: 2.54 (ref 0.86–1.14)
INR BLD: 3.15 (ref 0.86–1.14)
INR BLD: 4.76 (ref 0.86–1.14)
INR BLD: >14.91 (ref 0.86–1.14)
LACTIC ACID, SEPSIS: 2 MMOL/L (ref 0.4–1.9)
LACTIC ACID, SEPSIS: 2.5 MMOL/L (ref 0.4–1.9)
LACTIC ACID: 4 MMOL/L (ref 0.4–2)
LACTIC ACID: 7.7 MMOL/L (ref 0.4–2)
LIPASE: 7 U/L (ref 13–60)
LYMPHOCYTES ABSOLUTE: 0.4 K/UL (ref 1–5.1)
LYMPHOCYTES ABSOLUTE: 0.5 K/UL (ref 1–5.1)
LYMPHOCYTES ABSOLUTE: 0.6 K/UL (ref 1–5.1)
LYMPHOCYTES ABSOLUTE: 0.7 K/UL (ref 1–5.1)
LYMPHOCYTES ABSOLUTE: 0.8 K/UL (ref 1–5.1)
LYMPHOCYTES ABSOLUTE: 0.9 K/UL (ref 1–5.1)
LYMPHOCYTES ABSOLUTE: 1.2 K/UL (ref 1–5.1)
LYMPHOCYTES ABSOLUTE: 1.3 K/UL (ref 1–5.1)
LYMPHOCYTES ABSOLUTE: 1.4 K/UL (ref 1–5.1)
LYMPHOCYTES ABSOLUTE: 1.6 K/UL (ref 1–5.1)
LYMPHOCYTES ABSOLUTE: 1.6 K/UL (ref 1–5.1)
LYMPHOCYTES ABSOLUTE: 1.8 K/UL (ref 1–5.1)
LYMPHOCYTES ABSOLUTE: 3.2 K/UL (ref 1–5.1)
LYMPHOCYTES ABSOLUTE: 3.5 K/UL (ref 1–5.1)
LYMPHOCYTES RELATIVE PERCENT: 10 %
LYMPHOCYTES RELATIVE PERCENT: 10 %
LYMPHOCYTES RELATIVE PERCENT: 12 %
LYMPHOCYTES RELATIVE PERCENT: 14 %
LYMPHOCYTES RELATIVE PERCENT: 15 %
LYMPHOCYTES RELATIVE PERCENT: 15 %
LYMPHOCYTES RELATIVE PERCENT: 20 %
LYMPHOCYTES RELATIVE PERCENT: 23 %
LYMPHOCYTES RELATIVE PERCENT: 4 %
LYMPHOCYTES RELATIVE PERCENT: 5 %
LYMPHOCYTES RELATIVE PERCENT: 5 %
LYMPHOCYTES RELATIVE PERCENT: 5.8 %
LYMPHOCYTES RELATIVE PERCENT: 6.5 %
LYMPHOCYTES RELATIVE PERCENT: 9 %
MAGNESIUM: 2.1 MG/DL (ref 1.8–2.4)
MAGNESIUM: 2.2 MG/DL (ref 1.8–2.4)
MAGNESIUM: 2.3 MG/DL (ref 1.8–2.4)
MCH RBC QN AUTO: 24 PG (ref 26–34)
MCH RBC QN AUTO: 26 PG (ref 26–34)
MCH RBC QN AUTO: 26.2 PG (ref 26–34)
MCH RBC QN AUTO: 26.2 PG (ref 26–34)
MCH RBC QN AUTO: 26.4 PG (ref 26–34)
MCH RBC QN AUTO: 26.4 PG (ref 26–34)
MCH RBC QN AUTO: 26.6 PG (ref 26–34)
MCH RBC QN AUTO: 26.7 PG (ref 26–34)
MCH RBC QN AUTO: 26.9 PG (ref 26–34)
MCH RBC QN AUTO: 26.9 PG (ref 26–34)
MCH RBC QN AUTO: 27 PG (ref 26–34)
MCH RBC QN AUTO: 27.1 PG (ref 26–34)
MCHC RBC AUTO-ENTMCNC: 26.7 G/DL (ref 31–36)
MCHC RBC AUTO-ENTMCNC: 28.9 G/DL (ref 31–36)
MCHC RBC AUTO-ENTMCNC: 29.2 G/DL (ref 31–36)
MCHC RBC AUTO-ENTMCNC: 29.3 G/DL (ref 31–36)
MCHC RBC AUTO-ENTMCNC: 29.4 G/DL (ref 31–36)
MCHC RBC AUTO-ENTMCNC: 29.4 G/DL (ref 31–36)
MCHC RBC AUTO-ENTMCNC: 29.5 G/DL (ref 31–36)
MCHC RBC AUTO-ENTMCNC: 29.5 G/DL (ref 31–36)
MCHC RBC AUTO-ENTMCNC: 29.7 G/DL (ref 31–36)
MCHC RBC AUTO-ENTMCNC: 29.8 G/DL (ref 31–36)
MCHC RBC AUTO-ENTMCNC: 29.8 G/DL (ref 31–36)
MCHC RBC AUTO-ENTMCNC: 29.9 G/DL (ref 31–36)
MCHC RBC AUTO-ENTMCNC: 30 G/DL (ref 31–36)
MCHC RBC AUTO-ENTMCNC: 30 G/DL (ref 31–36)
MCV RBC AUTO: 88 FL (ref 80–100)
MCV RBC AUTO: 88.8 FL (ref 80–100)
MCV RBC AUTO: 89.2 FL (ref 80–100)
MCV RBC AUTO: 89.4 FL (ref 80–100)
MCV RBC AUTO: 89.4 FL (ref 80–100)
MCV RBC AUTO: 89.6 FL (ref 80–100)
MCV RBC AUTO: 89.7 FL (ref 80–100)
MCV RBC AUTO: 89.7 FL (ref 80–100)
MCV RBC AUTO: 90 FL (ref 80–100)
MCV RBC AUTO: 90.7 FL (ref 80–100)
MCV RBC AUTO: 90.8 FL (ref 80–100)
MCV RBC AUTO: 91 FL (ref 80–100)
MCV RBC AUTO: 91.2 FL (ref 80–100)
MCV RBC AUTO: 92 FL (ref 80–100)
METAMYELOCYTES RELATIVE PERCENT: 1 %
METAMYELOCYTES RELATIVE PERCENT: 1 %
METAMYELOCYTES RELATIVE PERCENT: 2 %
METAMYELOCYTES RELATIVE PERCENT: 3 %
METAMYELOCYTES RELATIVE PERCENT: 4 %
METHEMOGLOBIN ARTERIAL: 0.2 %
METHEMOGLOBIN ARTERIAL: 0.2 %
MONOCYTES ABSOLUTE: 0.2 K/UL (ref 0–1.3)
MONOCYTES ABSOLUTE: 0.2 K/UL (ref 0–1.3)
MONOCYTES ABSOLUTE: 0.3 K/UL (ref 0–1.3)
MONOCYTES ABSOLUTE: 0.3 K/UL (ref 0–1.3)
MONOCYTES ABSOLUTE: 0.4 K/UL (ref 0–1.3)
MONOCYTES ABSOLUTE: 0.4 K/UL (ref 0–1.3)
MONOCYTES ABSOLUTE: 0.5 K/UL (ref 0–1.3)
MONOCYTES ABSOLUTE: 0.5 K/UL (ref 0–1.3)
MONOCYTES ABSOLUTE: 0.6 K/UL (ref 0–1.3)
MONOCYTES ABSOLUTE: 0.7 K/UL (ref 0–1.3)
MONOCYTES ABSOLUTE: 0.9 K/UL (ref 0–1.3)
MONOCYTES ABSOLUTE: 1.1 K/UL (ref 0–1.3)
MONOCYTES RELATIVE PERCENT: 2 %
MONOCYTES RELATIVE PERCENT: 3 %
MONOCYTES RELATIVE PERCENT: 4 %
MONOCYTES RELATIVE PERCENT: 4 %
MONOCYTES RELATIVE PERCENT: 5 %
MONOCYTES RELATIVE PERCENT: 5 %
MONOCYTES RELATIVE PERCENT: 6 %
MONOCYTES RELATIVE PERCENT: 6.6 %
MONOCYTES RELATIVE PERCENT: 6.7 %
MONOCYTES RELATIVE PERCENT: 7 %
MYELOCYTE PERCENT: 1 %
MYELOCYTE PERCENT: 1 %
MYELOCYTE PERCENT: 3 %
MYELOCYTE PERCENT: 4 %
MYELOCYTE PERCENT: 7 %
NEUTROPHILS ABSOLUTE: 11.3 K/UL (ref 1.7–7.7)
NEUTROPHILS ABSOLUTE: 11.4 K/UL (ref 1.7–7.7)
NEUTROPHILS ABSOLUTE: 12.1 K/UL (ref 1.7–7.7)
NEUTROPHILS ABSOLUTE: 12.2 K/UL (ref 1.7–7.7)
NEUTROPHILS ABSOLUTE: 12.2 K/UL (ref 1.7–7.7)
NEUTROPHILS ABSOLUTE: 12.3 K/UL (ref 1.7–7.7)
NEUTROPHILS ABSOLUTE: 12.9 K/UL (ref 1.7–7.7)
NEUTROPHILS ABSOLUTE: 16.3 K/UL (ref 1.7–7.7)
NEUTROPHILS ABSOLUTE: 4.6 K/UL (ref 1.7–7.7)
NEUTROPHILS ABSOLUTE: 7.3 K/UL (ref 1.7–7.7)
NEUTROPHILS ABSOLUTE: 7.3 K/UL (ref 1.7–7.7)
NEUTROPHILS ABSOLUTE: 7.5 K/UL (ref 1.7–7.7)
NEUTROPHILS ABSOLUTE: 8.7 K/UL (ref 1.7–7.7)
NEUTROPHILS ABSOLUTE: 9.6 K/UL (ref 1.7–7.7)
NEUTROPHILS RELATIVE PERCENT: 70 %
NEUTROPHILS RELATIVE PERCENT: 71 %
NEUTROPHILS RELATIVE PERCENT: 75 %
NEUTROPHILS RELATIVE PERCENT: 76 %
NEUTROPHILS RELATIVE PERCENT: 79 %
NEUTROPHILS RELATIVE PERCENT: 80 %
NEUTROPHILS RELATIVE PERCENT: 81 %
NEUTROPHILS RELATIVE PERCENT: 82 %
NEUTROPHILS RELATIVE PERCENT: 82 %
NEUTROPHILS RELATIVE PERCENT: 85 %
NEUTROPHILS RELATIVE PERCENT: 85 %
NEUTROPHILS RELATIVE PERCENT: 86.5 %
NEUTROPHILS RELATIVE PERCENT: 87.3 %
NEUTROPHILS RELATIVE PERCENT: 90 %
NUCLEATED RED BLOOD CELLS: 1 /100 WBC
NUCLEATED RED BLOOD CELLS: 1 /100 WBC
NUCLEATED RED BLOOD CELLS: 2 /100 WBC
NUCLEATED RED BLOOD CELLS: 2 /100 WBC
NUCLEATED RED BLOOD CELLS: 4 /100 WBC
NUCLEATED RED BLOOD CELLS: 6 /100 WBC
O2 CONTENT ARTERIAL: 15 ML/DL
O2 CONTENT ARTERIAL: 15 ML/DL
O2 SAT, ARTERIAL: 100 % (ref 93–100)
O2 SAT, ARTERIAL: 96.6 %
O2 SAT, ARTERIAL: 98.8 %
O2 THERAPY: ABNORMAL
O2 THERAPY: ABNORMAL
ORGANISM: ABNORMAL
OVALOCYTES: ABNORMAL
OVALOCYTES: ABNORMAL
PCO2 ARTERIAL: 30.8 MM HG (ref 35–45)
PCO2 ARTERIAL: 39 MMHG (ref 35–45)
PCO2 ARTERIAL: 44.4 MMHG (ref 35–45)
PDW BLD-RTO: 21.4 % (ref 12.4–15.4)
PDW BLD-RTO: 21.5 % (ref 12.4–15.4)
PDW BLD-RTO: 21.7 % (ref 12.4–15.4)
PDW BLD-RTO: 22 % (ref 12.4–15.4)
PDW BLD-RTO: 22.1 % (ref 12.4–15.4)
PDW BLD-RTO: 22.3 % (ref 12.4–15.4)
PDW BLD-RTO: 22.3 % (ref 12.4–15.4)
PDW BLD-RTO: 22.4 % (ref 12.4–15.4)
PERFORMED ON: ABNORMAL
PERFORMED ON: NORMAL
PH ARTERIAL: 7.33 (ref 7.35–7.45)
PH ARTERIAL: 7.38 (ref 7.35–7.45)
PH ARTERIAL: 7.42 (ref 7.35–7.45)
PH VENOUS: 7.31 (ref 7.35–7.45)
PH VENOUS: 7.32 (ref 7.35–7.45)
PH VENOUS: 7.35 (ref 7.35–7.45)
PH VENOUS: 7.35 (ref 7.35–7.45)
PH VENOUS: 7.36 (ref 7.35–7.45)
PH VENOUS: 7.36 (ref 7.35–7.45)
PH VENOUS: 7.37 (ref 7.35–7.45)
PH VENOUS: 7.38 (ref 7.35–7.45)
PH VENOUS: 7.39 (ref 7.35–7.45)
PH VENOUS: 7.4 (ref 7.35–7.45)
PH VENOUS: 7.41 (ref 7.35–7.45)
PH VENOUS: 7.43 (ref 7.35–7.45)
PHOSPHORUS: 1.4 MG/DL (ref 2.5–4.9)
PHOSPHORUS: 1.7 MG/DL (ref 2.5–4.9)
PHOSPHORUS: 1.8 MG/DL (ref 2.5–4.9)
PHOSPHORUS: 1.9 MG/DL (ref 2.5–4.9)
PHOSPHORUS: 2 MG/DL (ref 2.5–4.9)
PHOSPHORUS: 2 MG/DL (ref 2.5–4.9)
PHOSPHORUS: 2.1 MG/DL (ref 2.5–4.9)
PHOSPHORUS: 2.3 MG/DL (ref 2.5–4.9)
PHOSPHORUS: 2.9 MG/DL (ref 2.5–4.9)
PHOSPHORUS: 3.6 MG/DL (ref 2.5–4.9)
PHOSPHORUS: 3.7 MG/DL (ref 2.5–4.9)
PHOSPHORUS: 4.1 MG/DL (ref 2.5–4.9)
PHOSPHORUS: 4.5 MG/DL (ref 2.5–4.9)
PHOSPHORUS: 5.8 MG/DL (ref 2.5–4.9)
PHOSPHORUS: 7 MG/DL (ref 2.5–4.9)
PLATELET # BLD: 106 K/UL (ref 135–450)
PLATELET # BLD: 114 K/UL (ref 135–450)
PLATELET # BLD: 130 K/UL (ref 135–450)
PLATELET # BLD: 139 K/UL (ref 135–450)
PLATELET # BLD: 160 K/UL (ref 135–450)
PLATELET # BLD: 176 K/UL (ref 135–450)
PLATELET # BLD: 177 K/UL (ref 135–450)
PLATELET # BLD: 178 K/UL (ref 135–450)
PLATELET # BLD: 207 K/UL (ref 135–450)
PLATELET # BLD: 252 K/UL (ref 135–450)
PLATELET # BLD: 258 K/UL (ref 135–450)
PLATELET # BLD: 258 K/UL (ref 135–450)
PLATELET # BLD: 272 K/UL (ref 135–450)
PLATELET # BLD: 293 K/UL (ref 135–450)
PLATELET SLIDE REVIEW: ABNORMAL
PLATELET SLIDE REVIEW: ADEQUATE
PMV BLD AUTO: 7 FL (ref 5–10.5)
PMV BLD AUTO: 7.2 FL (ref 5–10.5)
PMV BLD AUTO: 7.4 FL (ref 5–10.5)
PMV BLD AUTO: 7.5 FL (ref 5–10.5)
PMV BLD AUTO: 7.6 FL (ref 5–10.5)
PMV BLD AUTO: 7.7 FL (ref 5–10.5)
PMV BLD AUTO: 7.7 FL (ref 5–10.5)
PMV BLD AUTO: 7.8 FL (ref 5–10.5)
PMV BLD AUTO: 7.8 FL (ref 5–10.5)
PMV BLD AUTO: 8 FL (ref 5–10.5)
PMV BLD AUTO: 8.1 FL (ref 5–10.5)
PMV BLD AUTO: 8.1 FL (ref 5–10.5)
PO2 ARTERIAL: 141.8 MMHG (ref 75–108)
PO2 ARTERIAL: 190.9 MM HG (ref 75–108)
PO2 ARTERIAL: 92.6 MMHG (ref 75–108)
POC SAMPLE TYPE: ABNORMAL
POIKILOCYTES: ABNORMAL
POLYCHROMASIA: ABNORMAL
POTASSIUM REFLEX MAGNESIUM: 3.6 MMOL/L (ref 3.5–5.1)
POTASSIUM REFLEX MAGNESIUM: 3.6 MMOL/L (ref 3.5–5.1)
POTASSIUM REFLEX MAGNESIUM: 3.7 MMOL/L (ref 3.5–5.1)
POTASSIUM REFLEX MAGNESIUM: 3.9 MMOL/L (ref 3.5–5.1)
POTASSIUM REFLEX MAGNESIUM: 5 MMOL/L (ref 3.5–5.1)
POTASSIUM SERPL-SCNC: 3.3 MMOL/L (ref 3.5–5.1)
POTASSIUM SERPL-SCNC: 3.6 MMOL/L (ref 3.5–5.1)
POTASSIUM SERPL-SCNC: 3.6 MMOL/L (ref 3.5–5.1)
POTASSIUM SERPL-SCNC: 3.7 MMOL/L (ref 3.5–5.1)
POTASSIUM SERPL-SCNC: 3.8 MMOL/L (ref 3.5–5.1)
POTASSIUM SERPL-SCNC: 3.9 MMOL/L (ref 3.5–5.1)
POTASSIUM SERPL-SCNC: 4 MMOL/L (ref 3.5–5.1)
POTASSIUM SERPL-SCNC: 4 MMOL/L (ref 3.5–5.1)
POTASSIUM SERPL-SCNC: 4.1 MMOL/L (ref 3.5–5.1)
POTASSIUM SERPL-SCNC: 4.3 MMOL/L (ref 3.5–5.1)
POTASSIUM SERPL-SCNC: 4.5 MMOL/L (ref 3.5–5.1)
POTASSIUM SERPL-SCNC: 4.5 MMOL/L (ref 3.5–5.1)
POTASSIUM SERPL-SCNC: 4.7 MMOL/L (ref 3.5–5.1)
POTASSIUM SERPL-SCNC: 5 MMOL/L (ref 3.5–5.1)
PRO-BNP: 8700 PG/ML (ref 0–124)
PROCALCITONIN: 1.69 NG/ML (ref 0–0.15)
PROTHROMBIN TIME: 15 SEC (ref 9.8–13)
PROTHROMBIN TIME: 15.3 SEC (ref 9.8–13)
PROTHROMBIN TIME: 16.5 SEC (ref 9.8–13)
PROTHROMBIN TIME: 17 SEC (ref 9.8–13)
PROTHROMBIN TIME: 17.7 SEC (ref 9.8–13)
PROTHROMBIN TIME: 18.4 SEC (ref 9.8–13)
PROTHROMBIN TIME: 20.1 SEC (ref 9.8–13)
PROTHROMBIN TIME: 21.2 SEC (ref 9.8–13)
PROTHROMBIN TIME: 21.6 SEC (ref 9.8–13)
PROTHROMBIN TIME: 22 SEC (ref 9.8–13)
PROTHROMBIN TIME: 24.5 SEC (ref 9.8–13)
PROTHROMBIN TIME: 29 SEC (ref 9.8–13)
PROTHROMBIN TIME: 35.9 SEC (ref 9.8–13)
PROTHROMBIN TIME: 54.3 SEC (ref 9.8–13)
PROTHROMBIN TIME: >170 SEC (ref 9.8–13)
RBC # BLD: 3.37 M/UL (ref 4.2–5.9)
RBC # BLD: 3.37 M/UL (ref 4.2–5.9)
RBC # BLD: 3.38 M/UL (ref 4.2–5.9)
RBC # BLD: 3.4 M/UL (ref 4.2–5.9)
RBC # BLD: 3.49 M/UL (ref 4.2–5.9)
RBC # BLD: 3.5 M/UL (ref 4.2–5.9)
RBC # BLD: 3.55 M/UL (ref 4.2–5.9)
RBC # BLD: 3.59 M/UL (ref 4.2–5.9)
RBC # BLD: 3.66 M/UL (ref 4.2–5.9)
RBC # BLD: 3.68 M/UL (ref 4.2–5.9)
RBC # BLD: 3.7 M/UL (ref 4.2–5.9)
RBC # BLD: 3.77 M/UL (ref 4.2–5.9)
RBC # BLD: 3.84 M/UL (ref 4.2–5.9)
RBC # BLD: 4.09 M/UL (ref 4.2–5.9)
SLIDE REVIEW: ABNORMAL
SODIUM BLD-SCNC: 127 MMOL/L (ref 136–145)
SODIUM BLD-SCNC: 128 MMOL/L (ref 136–145)
SODIUM BLD-SCNC: 129 MMOL/L (ref 136–145)
SODIUM BLD-SCNC: 130 MMOL/L (ref 136–145)
SODIUM BLD-SCNC: 130 MMOL/L (ref 136–145)
SODIUM BLD-SCNC: 131 MMOL/L (ref 136–145)
SODIUM BLD-SCNC: 132 MMOL/L (ref 136–145)
SODIUM BLD-SCNC: 133 MMOL/L (ref 136–145)
SODIUM BLD-SCNC: 133 MMOL/L (ref 136–145)
SODIUM BLD-SCNC: 134 MMOL/L (ref 136–145)
SODIUM BLD-SCNC: 134 MMOL/L (ref 136–145)
SODIUM BLD-SCNC: 135 MMOL/L (ref 136–145)
SODIUM BLD-SCNC: 136 MMOL/L (ref 136–145)
SODIUM BLD-SCNC: 136 MMOL/L (ref 136–145)
SODIUM BLD-SCNC: 137 MMOL/L (ref 136–145)
SODIUM BLD-SCNC: 137 MMOL/L (ref 136–145)
STOMATOCYTES: ABNORMAL
TARGET CELLS: ABNORMAL
TARGET CELLS: ABNORMAL
TCO2 ARTERIAL: 23.9 MMOL/L
TCO2 ARTERIAL: 24.2 MMOL/L
TEAR DROP CELLS: ABNORMAL
TOTAL PROTEIN: 6.3 G/DL (ref 6.4–8.2)
TOTAL PROTEIN: 6.4 G/DL (ref 6.4–8.2)
TOTAL PROTEIN: 6.5 G/DL (ref 6.4–8.2)
TOTAL PROTEIN: 6.5 G/DL (ref 6.4–8.2)
TOTAL PROTEIN: 7.4 G/DL (ref 6.4–8.2)
TROPONIN: 0.19 NG/ML
TROPONIN: 0.21 NG/ML
TROPONIN: 0.23 NG/ML
TROPONIN: 0.28 NG/ML
VANCOMYCIN RANDOM: 13 UG/ML
VANCOMYCIN RANDOM: 13.7 UG/ML
VANCOMYCIN RANDOM: 14.3 UG/ML
VANCOMYCIN RANDOM: 15.1 UG/ML
VANCOMYCIN RANDOM: 16.8 UG/ML
VANCOMYCIN RANDOM: 20.8 UG/ML
VANCOMYCIN RANDOM: 21.7 UG/ML
WBC # BLD: 10.6 K/UL (ref 4–11)
WBC # BLD: 11.3 K/UL (ref 4–11)
WBC # BLD: 13.1 K/UL (ref 4–11)
WBC # BLD: 13.4 K/UL (ref 4–11)
WBC # BLD: 14 K/UL (ref 4–11)
WBC # BLD: 14.7 K/UL (ref 4–11)
WBC # BLD: 15.4 K/UL (ref 4–11)
WBC # BLD: 15.6 K/UL (ref 4–11)
WBC # BLD: 15.9 K/UL (ref 4–11)
WBC # BLD: 18.3 K/UL (ref 4–11)
WBC # BLD: 5.6 K/UL (ref 4–11)
WBC # BLD: 8.3 K/UL (ref 4–11)
WBC # BLD: 8.7 K/UL (ref 4–11)
WBC # BLD: 8.8 K/UL (ref 4–11)

## 2019-01-01 PROCEDURE — 85610 PROTHROMBIN TIME: CPT

## 2019-01-01 PROCEDURE — 99291 CRITICAL CARE FIRST HOUR: CPT | Performed by: INTERNAL MEDICINE

## 2019-01-01 PROCEDURE — 96361 HYDRATE IV INFUSION ADD-ON: CPT

## 2019-01-01 PROCEDURE — 94750 HC PULMONARY COMPLIANCE STUDY: CPT

## 2019-01-01 PROCEDURE — 2000000000 HC ICU R&B

## 2019-01-01 PROCEDURE — 6360000002 HC RX W HCPCS: Performed by: INTERNAL MEDICINE

## 2019-01-01 PROCEDURE — 6360000002 HC RX W HCPCS: Performed by: PHYSICIAN ASSISTANT

## 2019-01-01 PROCEDURE — 94003 VENT MGMT INPAT SUBQ DAY: CPT

## 2019-01-01 PROCEDURE — 6370000000 HC RX 637 (ALT 250 FOR IP): Performed by: INTERNAL MEDICINE

## 2019-01-01 PROCEDURE — 82330 ASSAY OF CALCIUM: CPT

## 2019-01-01 PROCEDURE — 2700000000 HC OXYGEN THERAPY PER DAY

## 2019-01-01 PROCEDURE — 87077 CULTURE AEROBIC IDENTIFY: CPT

## 2019-01-01 PROCEDURE — 6370000000 HC RX 637 (ALT 250 FOR IP): Performed by: PHYSICIAN ASSISTANT

## 2019-01-01 PROCEDURE — 1200000000 HC SEMI PRIVATE

## 2019-01-01 PROCEDURE — 83735 ASSAY OF MAGNESIUM: CPT

## 2019-01-01 PROCEDURE — 99233 SBSQ HOSP IP/OBS HIGH 50: CPT | Performed by: INTERNAL MEDICINE

## 2019-01-01 PROCEDURE — 94761 N-INVAS EAR/PLS OXIMETRY MLT: CPT

## 2019-01-01 PROCEDURE — 85025 COMPLETE CBC W/AUTO DIFF WBC: CPT

## 2019-01-01 PROCEDURE — 2580000003 HC RX 258: Performed by: EMERGENCY MEDICINE

## 2019-01-01 PROCEDURE — 80069 RENAL FUNCTION PANEL: CPT

## 2019-01-01 PROCEDURE — 99291 CRITICAL CARE FIRST HOUR: CPT

## 2019-01-01 PROCEDURE — 94640 AIRWAY INHALATION TREATMENT: CPT

## 2019-01-01 PROCEDURE — 87205 SMEAR GRAM STAIN: CPT

## 2019-01-01 PROCEDURE — 36415 COLL VENOUS BLD VENIPUNCTURE: CPT

## 2019-01-01 PROCEDURE — 2580000003 HC RX 258: Performed by: PHYSICIAN ASSISTANT

## 2019-01-01 PROCEDURE — 2500000003 HC RX 250 WO HCPCS: Performed by: INTERNAL MEDICINE

## 2019-01-01 PROCEDURE — 87040 BLOOD CULTURE FOR BACTERIA: CPT

## 2019-01-01 PROCEDURE — 2580000003 HC RX 258

## 2019-01-01 PROCEDURE — 96365 THER/PROPH/DIAG IV INF INIT: CPT

## 2019-01-01 PROCEDURE — 90935 HEMODIALYSIS ONE EVALUATION: CPT

## 2019-01-01 PROCEDURE — 86850 RBC ANTIBODY SCREEN: CPT

## 2019-01-01 PROCEDURE — 5A1D70Z PERFORMANCE OF URINARY FILTRATION, INTERMITTENT, LESS THAN 6 HOURS PER DAY: ICD-10-PCS | Performed by: INTERNAL MEDICINE

## 2019-01-01 PROCEDURE — 99232 SBSQ HOSP IP/OBS MODERATE 35: CPT | Performed by: INTERNAL MEDICINE

## 2019-01-01 PROCEDURE — 2500000003 HC RX 250 WO HCPCS: Performed by: PHYSICIAN ASSISTANT

## 2019-01-01 PROCEDURE — 2580000003 HC RX 258: Performed by: INTERNAL MEDICINE

## 2019-01-01 PROCEDURE — 80053 COMPREHEN METABOLIC PANEL: CPT

## 2019-01-01 PROCEDURE — 93010 ELECTROCARDIOGRAM REPORT: CPT | Performed by: INTERNAL MEDICINE

## 2019-01-01 PROCEDURE — 6360000002 HC RX W HCPCS: Performed by: HOSPITALIST

## 2019-01-01 PROCEDURE — 99222 1ST HOSP IP/OBS MODERATE 55: CPT | Performed by: INTERNAL MEDICINE

## 2019-01-01 PROCEDURE — 71045 X-RAY EXAM CHEST 1 VIEW: CPT

## 2019-01-01 PROCEDURE — 80202 ASSAY OF VANCOMYCIN: CPT

## 2019-01-01 PROCEDURE — 93005 ELECTROCARDIOGRAM TRACING: CPT | Performed by: EMERGENCY MEDICINE

## 2019-01-01 PROCEDURE — 93005 ELECTROCARDIOGRAM TRACING: CPT | Performed by: INTERNAL MEDICINE

## 2019-01-01 PROCEDURE — 90945 DIALYSIS ONE EVALUATION: CPT

## 2019-01-01 PROCEDURE — 87186 SC STD MICRODIL/AGAR DIL: CPT

## 2019-01-01 PROCEDURE — 36592 COLLECT BLOOD FROM PICC: CPT

## 2019-01-01 PROCEDURE — 82803 BLOOD GASES ANY COMBINATION: CPT

## 2019-01-01 PROCEDURE — 36600 WITHDRAWAL OF ARTERIAL BLOOD: CPT

## 2019-01-01 PROCEDURE — 83880 ASSAY OF NATRIURETIC PEPTIDE: CPT

## 2019-01-01 PROCEDURE — 86901 BLOOD TYPING SEROLOGIC RH(D): CPT

## 2019-01-01 PROCEDURE — 92526 ORAL FUNCTION THERAPY: CPT

## 2019-01-01 PROCEDURE — 5A1955Z RESPIRATORY VENTILATION, GREATER THAN 96 CONSECUTIVE HOURS: ICD-10-PCS | Performed by: INTERNAL MEDICINE

## 2019-01-01 PROCEDURE — 2060000000 HC ICU INTERMEDIATE R&B

## 2019-01-01 PROCEDURE — 99292 CRITICAL CARE ADDL 30 MIN: CPT

## 2019-01-01 PROCEDURE — 96366 THER/PROPH/DIAG IV INF ADDON: CPT

## 2019-01-01 PROCEDURE — 83605 ASSAY OF LACTIC ACID: CPT

## 2019-01-01 PROCEDURE — 36430 TRANSFUSION BLD/BLD COMPNT: CPT

## 2019-01-01 PROCEDURE — P9047 ALBUMIN (HUMAN), 25%, 50ML: HCPCS | Performed by: INTERNAL MEDICINE

## 2019-01-01 PROCEDURE — 84100 ASSAY OF PHOSPHORUS: CPT

## 2019-01-01 PROCEDURE — 86704 HEP B CORE ANTIBODY TOTAL: CPT

## 2019-01-01 PROCEDURE — 2709999900 IR PICC WO SQ PORT/PUMP > 5 YEARS

## 2019-01-01 PROCEDURE — 92612 ENDOSCOPY SWALLOW (FEES) VID: CPT

## 2019-01-01 PROCEDURE — 84484 ASSAY OF TROPONIN QUANT: CPT

## 2019-01-01 PROCEDURE — 84145 PROCALCITONIN (PCT): CPT

## 2019-01-01 PROCEDURE — 6360000002 HC RX W HCPCS: Performed by: EMERGENCY MEDICINE

## 2019-01-01 PROCEDURE — 92610 EVALUATE SWALLOWING FUNCTION: CPT

## 2019-01-01 PROCEDURE — 87070 CULTURE OTHR SPECIMN AEROBIC: CPT

## 2019-01-01 PROCEDURE — 85730 THROMBOPLASTIN TIME PARTIAL: CPT

## 2019-01-01 PROCEDURE — 87340 HEPATITIS B SURFACE AG IA: CPT

## 2019-01-01 PROCEDURE — 02HV33Z INSERTION OF INFUSION DEVICE INTO SUPERIOR VENA CAVA, PERCUTANEOUS APPROACH: ICD-10-PCS | Performed by: RADIOLOGY

## 2019-01-01 PROCEDURE — 83036 HEMOGLOBIN GLYCOSYLATED A1C: CPT

## 2019-01-01 PROCEDURE — P9017 PLASMA 1 DONOR FRZ W/IN 8 HR: HCPCS

## 2019-01-01 PROCEDURE — 36556 INSERT NON-TUNNEL CV CATH: CPT

## 2019-01-01 PROCEDURE — 94002 VENT MGMT INPAT INIT DAY: CPT

## 2019-01-01 PROCEDURE — 2500000003 HC RX 250 WO HCPCS: Performed by: EMERGENCY MEDICINE

## 2019-01-01 PROCEDURE — 86403 PARTICLE AGGLUT ANTBDY SCRN: CPT

## 2019-01-01 PROCEDURE — 96368 THER/DIAG CONCURRENT INF: CPT

## 2019-01-01 PROCEDURE — 83690 ASSAY OF LIPASE: CPT

## 2019-01-01 PROCEDURE — 74018 RADEX ABDOMEN 1 VIEW: CPT

## 2019-01-01 PROCEDURE — 96375 TX/PRO/DX INJ NEW DRUG ADDON: CPT

## 2019-01-01 PROCEDURE — 0BH17EZ INSERTION OF ENDOTRACHEAL AIRWAY INTO TRACHEA, VIA NATURAL OR ARTIFICIAL OPENING: ICD-10-PCS | Performed by: INTERNAL MEDICINE

## 2019-01-01 PROCEDURE — 76937 US GUIDE VASCULAR ACCESS: CPT

## 2019-01-01 PROCEDURE — 86900 BLOOD TYPING SEROLOGIC ABO: CPT

## 2019-01-01 PROCEDURE — 86706 HEP B SURFACE ANTIBODY: CPT

## 2019-01-01 RX ORDER — HEPARIN SODIUM 1000 [USP'U]/ML
1000 INJECTION, SOLUTION INTRAVENOUS; SUBCUTANEOUS
Status: ACTIVE | OUTPATIENT
Start: 2019-01-01 | End: 2019-01-01

## 2019-01-01 RX ORDER — IPRATROPIUM BROMIDE AND ALBUTEROL SULFATE 2.5; .5 MG/3ML; MG/3ML
1 SOLUTION RESPIRATORY (INHALATION) EVERY 4 HOURS PRN
Status: DISCONTINUED | OUTPATIENT
Start: 2019-01-01 | End: 2019-01-01 | Stop reason: HOSPADM

## 2019-01-01 RX ORDER — SODIUM CHLORIDE 0.9 % (FLUSH) 0.9 %
10 SYRINGE (ML) INJECTION EVERY 12 HOURS SCHEDULED
Status: CANCELLED | OUTPATIENT
Start: 2019-01-01

## 2019-01-01 RX ORDER — SODIUM CHLORIDE 9 MG/ML
INJECTION, SOLUTION INTRAVENOUS
Status: DISPENSED
Start: 2019-01-01 | End: 2019-01-01

## 2019-01-01 RX ORDER — LANOLIN ALCOHOL/MO/W.PET/CERES
3 CREAM (GRAM) TOPICAL NIGHTLY PRN
COMMUNITY

## 2019-01-01 RX ORDER — SEVELAMER CARBONATE 800 MG/1
1 TABLET, FILM COATED ORAL
Status: ON HOLD | COMMUNITY
End: 2019-01-01 | Stop reason: HOSPADM

## 2019-01-01 RX ORDER — SCOLOPAMINE TRANSDERMAL SYSTEM 1 MG/1
1 PATCH, EXTENDED RELEASE TRANSDERMAL
COMMUNITY

## 2019-01-01 RX ORDER — MIDAZOLAM HYDROCHLORIDE 1 MG/ML
2 INJECTION INTRAMUSCULAR; INTRAVENOUS
Status: DISCONTINUED | OUTPATIENT
Start: 2019-01-01 | End: 2019-01-01

## 2019-01-01 RX ORDER — LIDOCAINE HYDROCHLORIDE 10 MG/ML
5 INJECTION, SOLUTION EPIDURAL; INFILTRATION; INTRACAUDAL; PERINEURAL ONCE
Status: DISCONTINUED | OUTPATIENT
Start: 2019-01-01 | End: 2019-01-01

## 2019-01-01 RX ORDER — FAMOTIDINE 20 MG/1
20 TABLET, FILM COATED ORAL 2 TIMES DAILY
Status: ON HOLD | COMMUNITY
End: 2019-01-01 | Stop reason: HOSPADM

## 2019-01-01 RX ORDER — LORATADINE 10 MG/1
10 TABLET ORAL DAILY
Status: ON HOLD | COMMUNITY
End: 2019-01-01 | Stop reason: HOSPADM

## 2019-01-01 RX ORDER — FLUTICASONE PROPIONATE 50 MCG
1 SPRAY, SUSPENSION (ML) NASAL DAILY
COMMUNITY

## 2019-01-01 RX ORDER — SODIUM CHLORIDE 9 MG/ML
INJECTION, SOLUTION INTRAVENOUS
Status: COMPLETED
Start: 2019-01-01 | End: 2019-01-01

## 2019-01-01 RX ORDER — HEPARIN SODIUM 1000 [USP'U]/ML
2100 INJECTION, SOLUTION INTRAVENOUS; SUBCUTANEOUS ONCE
Status: COMPLETED | OUTPATIENT
Start: 2019-01-01 | End: 2019-01-01

## 2019-01-01 RX ORDER — MAGNESIUM SULFATE 1 G/100ML
1 INJECTION INTRAVENOUS PRN
Status: DISCONTINUED | OUTPATIENT
Start: 2019-01-01 | End: 2019-01-01

## 2019-01-01 RX ORDER — LORAZEPAM 2 MG/ML
0.5 INJECTION INTRAMUSCULAR EVERY 6 HOURS PRN
Status: DISCONTINUED | OUTPATIENT
Start: 2019-01-01 | End: 2019-01-01 | Stop reason: HOSPADM

## 2019-01-01 RX ORDER — FENTANYL CITRATE 50 UG/ML
25 INJECTION, SOLUTION INTRAMUSCULAR; INTRAVENOUS
Status: DISCONTINUED | OUTPATIENT
Start: 2019-01-01 | End: 2019-01-01

## 2019-01-01 RX ORDER — 0.9 % SODIUM CHLORIDE 0.9 %
1000 INTRAVENOUS SOLUTION INTRAVENOUS ONCE
Status: COMPLETED | OUTPATIENT
Start: 2019-01-01 | End: 2019-01-01

## 2019-01-01 RX ORDER — LORAZEPAM 2 MG/ML
0.5 INJECTION INTRAMUSCULAR ONCE
Status: COMPLETED | OUTPATIENT
Start: 2019-01-01 | End: 2019-01-01

## 2019-01-01 RX ORDER — TETRACAINE HYDROCHLORIDE 5 MG/ML
1 SOLUTION OPHTHALMIC ONCE
Status: CANCELLED | OUTPATIENT
Start: 2019-01-01 | End: 2019-01-01

## 2019-01-01 RX ORDER — SENNA PLUS 8.6 MG/1
1 TABLET ORAL 2 TIMES DAILY
COMMUNITY

## 2019-01-01 RX ORDER — SODIUM CHLORIDE FOR INHALATION 3 %
4 VIAL, NEBULIZER (ML) INHALATION EVERY 6 HOURS PRN
Status: ON HOLD | COMMUNITY
End: 2019-01-01 | Stop reason: HOSPADM

## 2019-01-01 RX ORDER — WARFARIN SODIUM 2 MG/1
2 TABLET ORAL
Status: COMPLETED | OUTPATIENT
Start: 2019-01-01 | End: 2019-01-01

## 2019-01-01 RX ORDER — DEXTROSE MONOHYDRATE 50 MG/ML
100 INJECTION, SOLUTION INTRAVENOUS PRN
Status: DISCONTINUED | OUTPATIENT
Start: 2019-01-01 | End: 2019-01-01 | Stop reason: HOSPADM

## 2019-01-01 RX ORDER — MORPHINE SULFATE 4 MG/ML
2 INJECTION, SOLUTION INTRAMUSCULAR; INTRAVENOUS EVERY 4 HOURS PRN
Status: DISCONTINUED | OUTPATIENT
Start: 2019-01-01 | End: 2019-01-01

## 2019-01-01 RX ORDER — CIPROFLOXACIN HYDROCHLORIDE 3.5 MG/ML
1 SOLUTION/ DROPS TOPICAL SEE ADMIN INSTRUCTIONS
Status: CANCELLED | OUTPATIENT
Start: 2019-01-01

## 2019-01-01 RX ORDER — IPRATROPIUM BROMIDE AND ALBUTEROL SULFATE 2.5; .5 MG/3ML; MG/3ML
1 SOLUTION RESPIRATORY (INHALATION)
Status: DISCONTINUED | OUTPATIENT
Start: 2019-01-01 | End: 2019-01-01

## 2019-01-01 RX ORDER — 0.9 % SODIUM CHLORIDE 0.9 %
250 INTRAVENOUS SOLUTION INTRAVENOUS ONCE
Status: COMPLETED | OUTPATIENT
Start: 2019-01-01 | End: 2019-01-01

## 2019-01-01 RX ORDER — ACETAMINOPHEN 325 MG/1
650 TABLET ORAL EVERY 6 HOURS PRN
COMMUNITY

## 2019-01-01 RX ORDER — AMMONIUM LACTATE 12 G/100G
CREAM TOPICAL PRN
COMMUNITY

## 2019-01-01 RX ORDER — NICOTINE POLACRILEX 4 MG
15 LOZENGE BUCCAL PRN
Status: ON HOLD | COMMUNITY
End: 2019-01-01 | Stop reason: HOSPADM

## 2019-01-01 RX ORDER — CALCITONIN SALMON 200 [IU]/.09ML
1 SPRAY, METERED NASAL DAILY
Status: ON HOLD | COMMUNITY
End: 2019-01-01 | Stop reason: HOSPADM

## 2019-01-01 RX ORDER — OXYCODONE HYDROCHLORIDE 5 MG/1
10 CAPSULE ORAL EVERY 4 HOURS PRN
COMMUNITY

## 2019-01-01 RX ORDER — SODIUM CHLORIDE 0.9 % (FLUSH) 0.9 %
10 SYRINGE (ML) INJECTION PRN
Status: CANCELLED | OUTPATIENT
Start: 2019-01-01

## 2019-01-01 RX ORDER — LORAZEPAM 2 MG/ML
INJECTION INTRAMUSCULAR
Status: DISPENSED
Start: 2019-01-01 | End: 2019-01-01

## 2019-01-01 RX ORDER — BISACODYL 10 MG
10 SUPPOSITORY, RECTAL RECTAL DAILY PRN
COMMUNITY

## 2019-01-01 RX ORDER — NICOTINE POLACRILEX 4 MG
15 LOZENGE BUCCAL PRN
Status: DISCONTINUED | OUTPATIENT
Start: 2019-01-01 | End: 2019-01-01 | Stop reason: HOSPADM

## 2019-01-01 RX ORDER — 0.9 % SODIUM CHLORIDE 0.9 %
1000 INTRAVENOUS SOLUTION INTRAVENOUS ONCE
Status: DISCONTINUED | OUTPATIENT
Start: 2019-01-01 | End: 2019-01-01

## 2019-01-01 RX ORDER — OXYCODONE HYDROCHLORIDE 5 MG/1
5 TABLET ORAL EVERY 4 HOURS PRN
Status: DISCONTINUED | OUTPATIENT
Start: 2019-01-01 | End: 2019-01-01

## 2019-01-01 RX ORDER — WARFARIN SODIUM 5 MG/1
5 TABLET ORAL
Status: COMPLETED | OUTPATIENT
Start: 2019-01-01 | End: 2019-01-01

## 2019-01-01 RX ORDER — FAMOTIDINE 20 MG/1
20 TABLET, FILM COATED ORAL DAILY
Status: DISCONTINUED | OUTPATIENT
Start: 2019-01-01 | End: 2019-01-01 | Stop reason: HOSPADM

## 2019-01-01 RX ORDER — CHLORHEXIDINE GLUCONATE 0.12 MG/ML
15 RINSE ORAL 2 TIMES DAILY
Status: DISCONTINUED | OUTPATIENT
Start: 2019-01-01 | End: 2019-01-01 | Stop reason: HOSPADM

## 2019-01-01 RX ORDER — HEPARIN SODIUM 1000 [USP'U]/ML
4300 INJECTION, SOLUTION INTRAVENOUS; SUBCUTANEOUS PRN
Status: DISCONTINUED | OUTPATIENT
Start: 2019-01-01 | End: 2019-01-01 | Stop reason: HOSPADM

## 2019-01-01 RX ORDER — MIDODRINE HYDROCHLORIDE 10 MG/1
10 TABLET ORAL 3 TIMES DAILY
Status: ON HOLD | COMMUNITY
End: 2019-01-01 | Stop reason: HOSPADM

## 2019-01-01 RX ORDER — HEPARIN SODIUM 1000 [USP'U]/ML
2200 INJECTION, SOLUTION INTRAVENOUS; SUBCUTANEOUS ONCE
Status: COMPLETED | OUTPATIENT
Start: 2019-01-01 | End: 2019-01-01

## 2019-01-01 RX ORDER — IPRATROPIUM BROMIDE AND ALBUTEROL SULFATE 2.5; .5 MG/3ML; MG/3ML
1 SOLUTION RESPIRATORY (INHALATION) EVERY 4 HOURS PRN
Status: DISCONTINUED | OUTPATIENT
Start: 2019-01-01 | End: 2019-01-01

## 2019-01-01 RX ORDER — ALBUTEROL SULFATE 90 UG/1
4 AEROSOL, METERED RESPIRATORY (INHALATION) EVERY 4 HOURS
Status: DISCONTINUED | OUTPATIENT
Start: 2019-01-01 | End: 2019-01-01 | Stop reason: HOSPADM

## 2019-01-01 RX ORDER — HEPARIN SODIUM 1000 [USP'U]/ML
4500 INJECTION, SOLUTION INTRAVENOUS; SUBCUTANEOUS
Status: DISCONTINUED | OUTPATIENT
Start: 2019-01-01 | End: 2019-01-01 | Stop reason: HOSPADM

## 2019-01-01 RX ORDER — ATORVASTATIN CALCIUM 10 MG/1
20 TABLET, FILM COATED ORAL NIGHTLY
Status: DISCONTINUED | OUTPATIENT
Start: 2019-01-01 | End: 2019-01-01 | Stop reason: HOSPADM

## 2019-01-01 RX ORDER — MIDODRINE HYDROCHLORIDE 5 MG/1
10 TABLET ORAL ONCE
Status: COMPLETED | OUTPATIENT
Start: 2019-01-01 | End: 2019-01-01

## 2019-01-01 RX ORDER — SODIUM CHLORIDE 0.9 % (FLUSH) 0.9 %
10 SYRINGE (ML) INJECTION PRN
Status: DISCONTINUED | OUTPATIENT
Start: 2019-01-01 | End: 2019-01-01 | Stop reason: HOSPADM

## 2019-01-01 RX ORDER — TRAZODONE HYDROCHLORIDE 100 MG/1
100 TABLET ORAL NIGHTLY
Status: ON HOLD | COMMUNITY
End: 2019-01-01 | Stop reason: HOSPADM

## 2019-01-01 RX ORDER — TRAMADOL HYDROCHLORIDE 50 MG/1
25 TABLET ORAL EVERY 4 HOURS PRN
Status: DISCONTINUED | OUTPATIENT
Start: 2019-01-01 | End: 2019-01-01 | Stop reason: HOSPADM

## 2019-01-01 RX ORDER — MONTELUKAST SODIUM 10 MG/1
10 TABLET ORAL NIGHTLY
COMMUNITY

## 2019-01-01 RX ORDER — ALBUMIN (HUMAN) 12.5 G/50ML
12.5 SOLUTION INTRAVENOUS PRN
Status: DISCONTINUED | OUTPATIENT
Start: 2019-01-01 | End: 2019-01-01 | Stop reason: HOSPADM

## 2019-01-01 RX ORDER — ATORVASTATIN CALCIUM 10 MG/1
20 TABLET, FILM COATED ORAL NIGHTLY
Status: DISCONTINUED | OUTPATIENT
Start: 2019-01-01 | End: 2019-01-01

## 2019-01-01 RX ORDER — MIDODRINE HYDROCHLORIDE 5 MG/1
10 TABLET ORAL
Status: DISCONTINUED | OUTPATIENT
Start: 2019-01-01 | End: 2019-01-01 | Stop reason: HOSPADM

## 2019-01-01 RX ORDER — DEXTROSE MONOHYDRATE 25 G/50ML
12.5 INJECTION, SOLUTION INTRAVENOUS PRN
Status: DISCONTINUED | OUTPATIENT
Start: 2019-01-01 | End: 2019-01-01 | Stop reason: HOSPADM

## 2019-01-01 RX ORDER — ONDANSETRON 4 MG/1
4 TABLET, FILM COATED ORAL EVERY 6 HOURS PRN
COMMUNITY

## 2019-01-01 RX ORDER — OXYCODONE HYDROCHLORIDE 5 MG/1
10 TABLET ORAL EVERY 4 HOURS PRN
Status: DISCONTINUED | OUTPATIENT
Start: 2019-01-01 | End: 2019-01-01 | Stop reason: HOSPADM

## 2019-01-01 RX ORDER — GABAPENTIN 100 MG/1
100 CAPSULE ORAL DAILY
COMMUNITY

## 2019-01-01 RX ORDER — MIDODRINE HYDROCHLORIDE 10 MG/1
10 TABLET ORAL DAILY
Status: ON HOLD | COMMUNITY
End: 2019-01-01 | Stop reason: HOSPADM

## 2019-01-01 RX ORDER — WARFARIN SODIUM 3 MG/1
3 TABLET ORAL DAILY
Status: ON HOLD | COMMUNITY
End: 2019-01-01 | Stop reason: HOSPADM

## 2019-01-01 RX ORDER — SODIUM CHLORIDE 9 MG/ML
INJECTION, SOLUTION INTRAVENOUS
Status: DISCONTINUED
Start: 2019-01-01 | End: 2019-01-01

## 2019-01-01 RX ORDER — SODIUM CHLORIDE 0.9 % (FLUSH) 0.9 %
10 SYRINGE (ML) INJECTION EVERY 12 HOURS SCHEDULED
Status: DISCONTINUED | OUTPATIENT
Start: 2019-01-01 | End: 2019-01-01 | Stop reason: HOSPADM

## 2019-01-01 RX ORDER — SODIUM CHLORIDE 0.9 % (FLUSH) 0.9 %
10 SYRINGE (ML) INJECTION EVERY 12 HOURS SCHEDULED
Status: DISCONTINUED | OUTPATIENT
Start: 2019-01-01 | End: 2019-01-01 | Stop reason: SDUPTHER

## 2019-01-01 RX ORDER — SODIUM CHLORIDE 0.9 % (FLUSH) 0.9 %
10 SYRINGE (ML) INJECTION PRN
Status: DISCONTINUED | OUTPATIENT
Start: 2019-01-01 | End: 2019-01-01 | Stop reason: SDUPTHER

## 2019-01-01 RX ORDER — BENZONATATE 100 MG/1
100 CAPSULE ORAL 3 TIMES DAILY PRN
Status: ON HOLD | COMMUNITY
End: 2019-01-01 | Stop reason: HOSPADM

## 2019-01-01 RX ORDER — POTASSIUM CHLORIDE 29.8 MG/ML
20 INJECTION INTRAVENOUS PRN
Status: DISCONTINUED | OUTPATIENT
Start: 2019-01-01 | End: 2019-01-01

## 2019-01-01 RX ORDER — GUAIFENESIN 100 MG/5ML
200 SYRUP ORAL 4 TIMES DAILY PRN
Status: ON HOLD | COMMUNITY
End: 2019-01-01 | Stop reason: HOSPADM

## 2019-01-01 RX ADMIN — SODIUM PHOSPHATE, MONOBASIC, MONOHYDRATE 6 MMOL: 276; 142 INJECTION, SOLUTION INTRAVENOUS at 08:39

## 2019-01-01 RX ADMIN — CHLORHEXIDINE GLUCONATE 0.12% ORAL RINSE 15 ML: 1.2 LIQUID ORAL at 20:21

## 2019-01-01 RX ADMIN — Medication 4 PUFF: at 15:21

## 2019-01-01 RX ADMIN — Medication 500 ML/HR: at 23:07

## 2019-01-01 RX ADMIN — Medication 4 PUFF: at 14:40

## 2019-01-01 RX ADMIN — HYDROCORTISONE SODIUM SUCCINATE 50 MG: 100 INJECTION, POWDER, FOR SOLUTION INTRAMUSCULAR; INTRAVENOUS at 08:38

## 2019-01-01 RX ADMIN — MIDODRINE HYDROCHLORIDE 10 MG: 5 TABLET ORAL at 18:09

## 2019-01-01 RX ADMIN — Medication 2000 ML/HR: at 02:33

## 2019-01-01 RX ADMIN — Medication 4 PUFF: at 07:26

## 2019-01-01 RX ADMIN — IPRATROPIUM BROMIDE AND ALBUTEROL SULFATE 1 AMPULE: .5; 3 SOLUTION RESPIRATORY (INHALATION) at 19:20

## 2019-01-01 RX ADMIN — NOREPINEPHRINE BITARTRATE 13 MCG/MIN: 1 INJECTION INTRAVENOUS at 05:53

## 2019-01-01 RX ADMIN — Medication 4 PUFF: at 11:20

## 2019-01-01 RX ADMIN — Medication 2000 ML/HR: at 01:46

## 2019-01-01 RX ADMIN — SODIUM PHOSPHATE, MONOBASIC, MONOHYDRATE 12 MMOL: 276; 142 INJECTION, SOLUTION INTRAVENOUS at 01:14

## 2019-01-01 RX ADMIN — FENTANYL CITRATE 25 MCG: 50 INJECTION INTRAMUSCULAR; INTRAVENOUS at 10:04

## 2019-01-01 RX ADMIN — MEROPENEM 500 MG: 500 INJECTION, POWDER, FOR SOLUTION INTRAVENOUS at 11:23

## 2019-01-01 RX ADMIN — FENTANYL CITRATE 25 MCG: 50 INJECTION INTRAMUSCULAR; INTRAVENOUS at 23:28

## 2019-01-01 RX ADMIN — Medication 4 PUFF: at 07:58

## 2019-01-01 RX ADMIN — HEPARIN SODIUM 4300 UNITS: 1000 INJECTION INTRAVENOUS; SUBCUTANEOUS at 16:21

## 2019-01-01 RX ADMIN — Medication 4 PUFF: at 21:35

## 2019-01-01 RX ADMIN — Medication 4 PUFF: at 22:33

## 2019-01-01 RX ADMIN — MUPIROCIN: 20 OINTMENT TOPICAL at 20:20

## 2019-01-01 RX ADMIN — NOREPINEPHRINE BITARTRATE 8 MCG/MIN: 1 INJECTION INTRAVENOUS at 06:51

## 2019-01-01 RX ADMIN — Medication 10 ML: at 20:01

## 2019-01-01 RX ADMIN — MIDAZOLAM HYDROCHLORIDE 2 MG: 1 INJECTION, SOLUTION INTRAMUSCULAR; INTRAVENOUS at 14:39

## 2019-01-01 RX ADMIN — HYDROCORTISONE SODIUM SUCCINATE 50 MG: 100 INJECTION, POWDER, FOR SOLUTION INTRAMUSCULAR; INTRAVENOUS at 20:09

## 2019-01-01 RX ADMIN — Medication 4 PUFF: at 18:45

## 2019-01-01 RX ADMIN — Medication 4 PUFF: at 19:14

## 2019-01-01 RX ADMIN — Medication 2000 ML/HR: at 04:56

## 2019-01-01 RX ADMIN — CHLORHEXIDINE GLUCONATE 0.12% ORAL RINSE 15 ML: 1.2 LIQUID ORAL at 08:31

## 2019-01-01 RX ADMIN — Medication 1500 ML/HR: at 11:57

## 2019-01-01 RX ADMIN — INSULIN LISPRO 1 UNITS: 100 INJECTION, SOLUTION INTRAVENOUS; SUBCUTANEOUS at 16:41

## 2019-01-01 RX ADMIN — MORPHINE SULFATE 2 MG: 4 INJECTION INTRAVENOUS at 02:32

## 2019-01-01 RX ADMIN — HYDROCORTISONE SODIUM SUCCINATE 50 MG: 100 INJECTION, POWDER, FOR SOLUTION INTRAMUSCULAR; INTRAVENOUS at 03:32

## 2019-01-01 RX ADMIN — CHLORHEXIDINE GLUCONATE 0.12% ORAL RINSE 15 ML: 1.2 LIQUID ORAL at 08:10

## 2019-01-01 RX ADMIN — Medication 500 ML/HR: at 13:54

## 2019-01-01 RX ADMIN — Medication 4 PUFF: at 18:53

## 2019-01-01 RX ADMIN — Medication 4 PUFF: at 19:49

## 2019-01-01 RX ADMIN — METOPROLOL TARTRATE 12.5 MG: 25 TABLET ORAL at 10:05

## 2019-01-01 RX ADMIN — Medication 4 PUFF: at 15:22

## 2019-01-01 RX ADMIN — Medication 4 PUFF: at 23:29

## 2019-01-01 RX ADMIN — Medication 10 ML: at 08:15

## 2019-01-01 RX ADMIN — MIDODRINE HYDROCHLORIDE 10 MG: 5 TABLET ORAL at 13:05

## 2019-01-01 RX ADMIN — Medication 4 PUFF: at 02:56

## 2019-01-01 RX ADMIN — Medication 10 ML: at 08:09

## 2019-01-01 RX ADMIN — IPRATROPIUM BROMIDE AND ALBUTEROL SULFATE 1 AMPULE: .5; 3 SOLUTION RESPIRATORY (INHALATION) at 10:53

## 2019-01-01 RX ADMIN — IPRATROPIUM BROMIDE AND ALBUTEROL SULFATE 1 AMPULE: .5; 3 SOLUTION RESPIRATORY (INHALATION) at 10:51

## 2019-01-01 RX ADMIN — NOREPINEPHRINE BITARTRATE 3 MCG/MIN: 1 INJECTION INTRAVENOUS at 08:06

## 2019-01-01 RX ADMIN — FENTANYL CITRATE 25 MCG: 50 INJECTION INTRAMUSCULAR; INTRAVENOUS at 17:50

## 2019-01-01 RX ADMIN — CHLORHEXIDINE GLUCONATE 0.12% ORAL RINSE 15 ML: 1.2 LIQUID ORAL at 20:36

## 2019-01-01 RX ADMIN — INSULIN LISPRO 1 UNITS: 100 INJECTION, SOLUTION INTRAVENOUS; SUBCUTANEOUS at 17:15

## 2019-01-01 RX ADMIN — Medication 4 PUFF: at 03:14

## 2019-01-01 RX ADMIN — OXYCODONE HYDROCHLORIDE 10 MG: 5 TABLET ORAL at 15:43

## 2019-01-01 RX ADMIN — MEROPENEM 1 G: 1 INJECTION, POWDER, FOR SOLUTION INTRAVENOUS at 16:52

## 2019-01-01 RX ADMIN — INSULIN LISPRO 1 UNITS: 100 INJECTION, SOLUTION INTRAVENOUS; SUBCUTANEOUS at 11:58

## 2019-01-01 RX ADMIN — FAMOTIDINE 20 MG: 10 INJECTION, SOLUTION INTRAVENOUS at 08:09

## 2019-01-01 RX ADMIN — MIDAZOLAM HYDROCHLORIDE 2 MG: 1 INJECTION, SOLUTION INTRAMUSCULAR; INTRAVENOUS at 14:16

## 2019-01-01 RX ADMIN — CEFEPIME 2 G: 2 INJECTION, POWDER, FOR SOLUTION INTRAVENOUS at 16:07

## 2019-01-01 RX ADMIN — NOREPINEPHRINE BITARTRATE 4 MCG/MIN: 1 INJECTION INTRAVENOUS at 18:39

## 2019-01-01 RX ADMIN — CHLORHEXIDINE GLUCONATE 0.12% ORAL RINSE 15 ML: 1.2 LIQUID ORAL at 20:42

## 2019-01-01 RX ADMIN — MIDODRINE HYDROCHLORIDE 10 MG: 5 TABLET ORAL at 16:16

## 2019-01-01 RX ADMIN — Medication 4 PUFF: at 22:52

## 2019-01-01 RX ADMIN — PIPERACILLIN AND TAZOBACTAM 3.38 G: 3; .375 INJECTION, POWDER, FOR SOLUTION INTRAVENOUS at 13:43

## 2019-01-01 RX ADMIN — FENTANYL CITRATE 25 MCG: 50 INJECTION INTRAMUSCULAR; INTRAVENOUS at 21:53

## 2019-01-01 RX ADMIN — VASOPRESSIN 0.04 UNITS/MIN: 20 INJECTION INTRAVENOUS at 12:34

## 2019-01-01 RX ADMIN — Medication 10 ML: at 08:22

## 2019-01-01 RX ADMIN — FENTANYL CITRATE 25 MCG: 50 INJECTION INTRAMUSCULAR; INTRAVENOUS at 11:10

## 2019-01-01 RX ADMIN — MIDODRINE HYDROCHLORIDE 10 MG: 5 TABLET ORAL at 14:06

## 2019-01-01 RX ADMIN — Medication 10 ML: at 19:53

## 2019-01-01 RX ADMIN — Medication 2000 ML/HR: at 22:38

## 2019-01-01 RX ADMIN — MIDAZOLAM HYDROCHLORIDE 2 MG: 1 INJECTION, SOLUTION INTRAMUSCULAR; INTRAVENOUS at 09:46

## 2019-01-01 RX ADMIN — FENTANYL CITRATE 25 MCG: 50 INJECTION INTRAMUSCULAR; INTRAVENOUS at 16:31

## 2019-01-01 RX ADMIN — Medication 4 PUFF: at 03:30

## 2019-01-01 RX ADMIN — Medication 4 PUFF: at 06:46

## 2019-01-01 RX ADMIN — FENTANYL CITRATE 25 MCG: 50 INJECTION INTRAMUSCULAR; INTRAVENOUS at 17:34

## 2019-01-01 RX ADMIN — CALCIUM GLUCONATE 1 G: 98 INJECTION, SOLUTION INTRAVENOUS at 02:40

## 2019-01-01 RX ADMIN — CHLORHEXIDINE GLUCONATE 0.12% ORAL RINSE 15 ML: 1.2 LIQUID ORAL at 08:12

## 2019-01-01 RX ADMIN — Medication 4 PUFF: at 02:35

## 2019-01-01 RX ADMIN — MUPIROCIN: 20 OINTMENT TOPICAL at 20:09

## 2019-01-01 RX ADMIN — HEPARIN SODIUM: 1000 INJECTION, SOLUTION INTRAVENOUS; SUBCUTANEOUS at 04:14

## 2019-01-01 RX ADMIN — Medication 4 PUFF: at 23:00

## 2019-01-01 RX ADMIN — Medication 4 PUFF: at 06:45

## 2019-01-01 RX ADMIN — Medication 1 SPRAY: at 18:34

## 2019-01-01 RX ADMIN — FENTANYL CITRATE 25 MCG: 50 INJECTION INTRAMUSCULAR; INTRAVENOUS at 23:14

## 2019-01-01 RX ADMIN — INSULIN LISPRO 1 UNITS: 100 INJECTION, SOLUTION INTRAVENOUS; SUBCUTANEOUS at 19:57

## 2019-01-01 RX ADMIN — FAMOTIDINE 20 MG: 20 TABLET, FILM COATED ORAL at 11:47

## 2019-01-01 RX ADMIN — VASOPRESSIN 0.04 UNITS/MIN: 20 INJECTION INTRAVENOUS at 23:02

## 2019-01-01 RX ADMIN — Medication 4 PUFF: at 23:39

## 2019-01-01 RX ADMIN — MIDAZOLAM HYDROCHLORIDE 2 MG: 1 INJECTION, SOLUTION INTRAMUSCULAR; INTRAVENOUS at 17:25

## 2019-01-01 RX ADMIN — Medication 4 PUFF: at 23:28

## 2019-01-01 RX ADMIN — Medication 4 PUFF: at 23:25

## 2019-01-01 RX ADMIN — FENTANYL CITRATE 25 MCG: 50 INJECTION INTRAMUSCULAR; INTRAVENOUS at 12:00

## 2019-01-01 RX ADMIN — HYDROCORTISONE SODIUM SUCCINATE 50 MG: 100 INJECTION, POWDER, FOR SOLUTION INTRAMUSCULAR; INTRAVENOUS at 05:52

## 2019-01-01 RX ADMIN — LORAZEPAM 0.5 MG: 2 INJECTION INTRAMUSCULAR; INTRAVENOUS at 22:00

## 2019-01-01 RX ADMIN — MIDODRINE HYDROCHLORIDE 10 MG: 5 TABLET ORAL at 11:43

## 2019-01-01 RX ADMIN — MUPIROCIN: 20 OINTMENT TOPICAL at 07:41

## 2019-01-01 RX ADMIN — Medication 4 PUFF: at 14:21

## 2019-01-01 RX ADMIN — Medication 1000 ML/HR: at 19:35

## 2019-01-01 RX ADMIN — Medication 2000 ML/HR: at 09:43

## 2019-01-01 RX ADMIN — CALCIUM GLUCONATE 1 G: 98 INJECTION, SOLUTION INTRAVENOUS at 08:47

## 2019-01-01 RX ADMIN — CALCIUM GLUCONATE 1 G: 98 INJECTION, SOLUTION INTRAVENOUS at 02:41

## 2019-01-01 RX ADMIN — Medication 2000 ML/HR: at 10:02

## 2019-01-01 RX ADMIN — Medication 4 PUFF: at 15:23

## 2019-01-01 RX ADMIN — HEPARIN SODIUM: 1000 INJECTION, SOLUTION INTRAVENOUS; SUBCUTANEOUS at 16:35

## 2019-01-01 RX ADMIN — CEFEPIME 2 G: 2 INJECTION, POWDER, FOR SOLUTION INTRAVENOUS at 16:32

## 2019-01-01 RX ADMIN — Medication 4 PUFF: at 19:02

## 2019-01-01 RX ADMIN — INSULIN LISPRO 1 UNITS: 100 INJECTION, SOLUTION INTRAVENOUS; SUBCUTANEOUS at 01:00

## 2019-01-01 RX ADMIN — DEXTROSE 50 % IN WATER (D50W) INTRAVENOUS SYRINGE 12.5 G: at 09:45

## 2019-01-01 RX ADMIN — SODIUM PHOSPHATE, MONOBASIC, MONOHYDRATE 18 MMOL: 276; 142 INJECTION, SOLUTION INTRAVENOUS at 02:40

## 2019-01-01 RX ADMIN — INSULIN LISPRO 1 UNITS: 100 INJECTION, SOLUTION INTRAVENOUS; SUBCUTANEOUS at 00:47

## 2019-01-01 RX ADMIN — MIDAZOLAM HYDROCHLORIDE 2 MG: 1 INJECTION, SOLUTION INTRAMUSCULAR; INTRAVENOUS at 01:52

## 2019-01-01 RX ADMIN — DARBEPOETIN ALFA 25 MCG: 25 INJECTION, SOLUTION INTRAVENOUS; SUBCUTANEOUS at 08:35

## 2019-01-01 RX ADMIN — VASOPRESSIN 0.04 UNITS/MIN: 20 INJECTION INTRAVENOUS at 08:06

## 2019-01-01 RX ADMIN — Medication 2000 ML/HR: at 21:01

## 2019-01-01 RX ADMIN — Medication 4 PUFF: at 03:28

## 2019-01-01 RX ADMIN — Medication 4 PUFF: at 08:05

## 2019-01-01 RX ADMIN — Medication 4 PUFF: at 03:29

## 2019-01-01 RX ADMIN — HEPARIN SODIUM 4300 UNITS: 1000 INJECTION INTRAVENOUS; SUBCUTANEOUS at 14:56

## 2019-01-01 RX ADMIN — SODIUM CHLORIDE 1000 ML: 9 INJECTION, SOLUTION INTRAVENOUS at 14:50

## 2019-01-01 RX ADMIN — CEFEPIME 2 G: 2 INJECTION, POWDER, FOR SOLUTION INTRAVENOUS at 03:16

## 2019-01-01 RX ADMIN — IPRATROPIUM BROMIDE AND ALBUTEROL SULFATE 1 AMPULE: .5; 3 SOLUTION RESPIRATORY (INHALATION) at 10:43

## 2019-01-01 RX ADMIN — FENTANYL CITRATE 25 MCG: 50 INJECTION INTRAMUSCULAR; INTRAVENOUS at 14:58

## 2019-01-01 RX ADMIN — MIDAZOLAM HYDROCHLORIDE 2 MG: 1 INJECTION, SOLUTION INTRAMUSCULAR; INTRAVENOUS at 01:22

## 2019-01-01 RX ADMIN — Medication 4 PUFF: at 06:54

## 2019-01-01 RX ADMIN — Medication 4 PUFF: at 14:54

## 2019-01-01 RX ADMIN — SODIUM CHLORIDE 1000 ML: 9 INJECTION, SOLUTION INTRAVENOUS at 14:41

## 2019-01-01 RX ADMIN — COLLAGENASE SANTYL: 250 OINTMENT TOPICAL at 08:13

## 2019-01-01 RX ADMIN — CALCIUM GLUCONATE 1 G: 98 INJECTION, SOLUTION INTRAVENOUS at 17:51

## 2019-01-01 RX ADMIN — Medication 2000 ML/HR: at 20:32

## 2019-01-01 RX ADMIN — Medication 4 PUFF: at 14:51

## 2019-01-01 RX ADMIN — MORPHINE SULFATE 2 MG: 4 INJECTION INTRAVENOUS at 04:25

## 2019-01-01 RX ADMIN — INSULIN LISPRO 1 UNITS: 100 INJECTION, SOLUTION INTRAVENOUS; SUBCUTANEOUS at 11:50

## 2019-01-01 RX ADMIN — FAMOTIDINE 20 MG: 10 INJECTION, SOLUTION INTRAVENOUS at 08:10

## 2019-01-01 RX ADMIN — HYDROCORTISONE SODIUM SUCCINATE 50 MG: 100 INJECTION, POWDER, FOR SOLUTION INTRAMUSCULAR; INTRAVENOUS at 16:27

## 2019-01-01 RX ADMIN — Medication 4 PUFF: at 18:57

## 2019-01-01 RX ADMIN — CEFEPIME 2 G: 2 INJECTION, POWDER, FOR SOLUTION INTRAVENOUS at 16:18

## 2019-01-01 RX ADMIN — Medication 4 PUFF: at 06:47

## 2019-01-01 RX ADMIN — HEPARIN SODIUM: 1000 INJECTION, SOLUTION INTRAVENOUS; SUBCUTANEOUS at 05:03

## 2019-01-01 RX ADMIN — MUPIROCIN: 20 OINTMENT TOPICAL at 08:15

## 2019-01-01 RX ADMIN — CEFEPIME 2 G: 2 INJECTION, POWDER, FOR SOLUTION INTRAVENOUS at 17:50

## 2019-01-01 RX ADMIN — Medication 4 PUFF: at 10:30

## 2019-01-01 RX ADMIN — Medication 4 PUFF: at 18:40

## 2019-01-01 RX ADMIN — CEFEPIME 2 G: 2 INJECTION, POWDER, FOR SOLUTION INTRAVENOUS at 04:38

## 2019-01-01 RX ADMIN — Medication 10 ML: at 20:36

## 2019-01-01 RX ADMIN — CHLORHEXIDINE GLUCONATE 0.12% ORAL RINSE 15 ML: 1.2 LIQUID ORAL at 07:33

## 2019-01-01 RX ADMIN — FENTANYL CITRATE 25 MCG: 50 INJECTION INTRAMUSCULAR; INTRAVENOUS at 15:26

## 2019-01-01 RX ADMIN — CHLORHEXIDINE GLUCONATE 0.12% ORAL RINSE 15 ML: 1.2 LIQUID ORAL at 08:27

## 2019-01-01 RX ADMIN — COLLAGENASE SANTYL: 250 OINTMENT TOPICAL at 08:22

## 2019-01-01 RX ADMIN — NOREPINEPHRINE BITARTRATE 20 MCG/MIN: 1 INJECTION INTRAVENOUS at 17:40

## 2019-01-01 RX ADMIN — CHLORHEXIDINE GLUCONATE 0.12% ORAL RINSE 15 ML: 1.2 LIQUID ORAL at 08:20

## 2019-01-01 RX ADMIN — MIDODRINE HYDROCHLORIDE 10 MG: 5 TABLET ORAL at 09:43

## 2019-01-01 RX ADMIN — VANCOMYCIN HYDROCHLORIDE 1.5 G: 1 INJECTION, POWDER, LYOPHILIZED, FOR SOLUTION INTRAVENOUS at 12:08

## 2019-01-01 RX ADMIN — FENTANYL CITRATE 25 MCG: 50 INJECTION INTRAMUSCULAR; INTRAVENOUS at 07:32

## 2019-01-01 RX ADMIN — Medication 4 PUFF: at 06:37

## 2019-01-01 RX ADMIN — NOREPINEPHRINE BITARTRATE 9 MCG/MIN: 1 INJECTION INTRAVENOUS at 17:12

## 2019-01-01 RX ADMIN — MIDODRINE HYDROCHLORIDE 10 MG: 5 TABLET ORAL at 15:53

## 2019-01-01 RX ADMIN — Medication 4 PUFF: at 02:44

## 2019-01-01 RX ADMIN — SODIUM CHLORIDE 250 ML: 9 INJECTION, SOLUTION INTRAVENOUS at 22:43

## 2019-01-01 RX ADMIN — WARFARIN SODIUM 3 MG: 1 TABLET ORAL at 17:29

## 2019-01-01 RX ADMIN — MEROPENEM 500 MG: 500 INJECTION, POWDER, FOR SOLUTION INTRAVENOUS at 11:36

## 2019-01-01 RX ADMIN — HYDROCORTISONE SODIUM SUCCINATE 50 MG: 100 INJECTION, POWDER, FOR SOLUTION INTRAMUSCULAR; INTRAVENOUS at 11:43

## 2019-01-01 RX ADMIN — COLLAGENASE SANTYL: 250 OINTMENT TOPICAL at 04:00

## 2019-01-01 RX ADMIN — Medication 4 PUFF: at 06:52

## 2019-01-01 RX ADMIN — Medication 10 ML: at 01:22

## 2019-01-01 RX ADMIN — Medication 4 PUFF: at 11:23

## 2019-01-01 RX ADMIN — OXYCODONE HYDROCHLORIDE 5 MG: 5 TABLET ORAL at 11:03

## 2019-01-01 RX ADMIN — CHLORHEXIDINE GLUCONATE 0.12% ORAL RINSE 15 ML: 1.2 LIQUID ORAL at 09:44

## 2019-01-01 RX ADMIN — FENTANYL CITRATE 25 MCG: 50 INJECTION INTRAMUSCULAR; INTRAVENOUS at 01:22

## 2019-01-01 RX ADMIN — Medication 1000 ML/HR: at 14:40

## 2019-01-01 RX ADMIN — Medication 4 PUFF: at 11:59

## 2019-01-01 RX ADMIN — OXYCODONE HYDROCHLORIDE 10 MG: 5 TABLET ORAL at 19:58

## 2019-01-01 RX ADMIN — CEFEPIME 2 G: 2 INJECTION, POWDER, FOR SOLUTION INTRAVENOUS at 04:13

## 2019-01-01 RX ADMIN — Medication 2000 ML/HR: at 01:16

## 2019-01-01 RX ADMIN — POTASSIUM CHLORIDE 20 MEQ: 400 INJECTION, SOLUTION INTRAVENOUS at 04:02

## 2019-01-01 RX ADMIN — CALCIUM GLUCONATE 1 G: 98 INJECTION, SOLUTION INTRAVENOUS at 10:00

## 2019-01-01 RX ADMIN — HYDROCORTISONE SODIUM SUCCINATE 50 MG: 100 INJECTION, POWDER, FOR SOLUTION INTRAMUSCULAR; INTRAVENOUS at 05:31

## 2019-01-01 RX ADMIN — Medication 4 PUFF: at 18:52

## 2019-01-01 RX ADMIN — Medication 2000 ML/HR: at 19:59

## 2019-01-01 RX ADMIN — FENTANYL CITRATE 25 MCG: 50 INJECTION INTRAMUSCULAR; INTRAVENOUS at 15:01

## 2019-01-01 RX ADMIN — COLLAGENASE SANTYL: 250 OINTMENT TOPICAL at 08:29

## 2019-01-01 RX ADMIN — SODIUM CHLORIDE 2000 ML: 9 INJECTION, SOLUTION INTRAVENOUS at 11:11

## 2019-01-01 RX ADMIN — Medication 10 ML: at 01:42

## 2019-01-01 RX ADMIN — ALBUMIN (HUMAN) 12.5 G: 0.25 INJECTION, SOLUTION INTRAVENOUS at 12:44

## 2019-01-01 RX ADMIN — VANCOMYCIN HYDROCHLORIDE 1000 MG: 1 INJECTION, POWDER, LYOPHILIZED, FOR SOLUTION INTRAVENOUS at 12:34

## 2019-01-01 RX ADMIN — HEPARIN SODIUM: 1000 INJECTION, SOLUTION INTRAVENOUS; SUBCUTANEOUS at 18:38

## 2019-01-01 RX ADMIN — CEFEPIME 2 G: 2 INJECTION, POWDER, FOR SOLUTION INTRAVENOUS at 04:11

## 2019-01-01 RX ADMIN — CALCIUM GLUCONATE 1 G: 98 INJECTION, SOLUTION INTRAVENOUS at 01:48

## 2019-01-01 RX ADMIN — Medication 10 ML: at 20:56

## 2019-01-01 RX ADMIN — Medication 4 PUFF: at 02:43

## 2019-01-01 RX ADMIN — ALBUMIN (HUMAN) 12.5 G: 0.25 INJECTION, SOLUTION INTRAVENOUS at 11:02

## 2019-01-01 RX ADMIN — MIDODRINE HYDROCHLORIDE 10 MG: 5 TABLET ORAL at 11:12

## 2019-01-01 RX ADMIN — MIDODRINE HYDROCHLORIDE 10 MG: 5 TABLET ORAL at 08:12

## 2019-01-01 RX ADMIN — HYDROCORTISONE SODIUM SUCCINATE 50 MG: 100 INJECTION, POWDER, FOR SOLUTION INTRAMUSCULAR; INTRAVENOUS at 11:36

## 2019-01-01 RX ADMIN — METOPROLOL TARTRATE 12.5 MG: 25 TABLET ORAL at 16:50

## 2019-01-01 RX ADMIN — METOPROLOL TARTRATE 12.5 MG: 25 TABLET ORAL at 18:09

## 2019-01-01 RX ADMIN — ATORVASTATIN CALCIUM 20 MG: 10 TABLET, FILM COATED ORAL at 18:09

## 2019-01-01 RX ADMIN — Medication 10 ML: at 18:09

## 2019-01-01 RX ADMIN — FENTANYL CITRATE 25 MCG: 50 INJECTION INTRAMUSCULAR; INTRAVENOUS at 01:52

## 2019-01-01 RX ADMIN — Medication 4 PUFF: at 07:41

## 2019-01-01 RX ADMIN — MIDODRINE HYDROCHLORIDE 10 MG: 5 TABLET ORAL at 11:36

## 2019-01-01 RX ADMIN — CHLORHEXIDINE GLUCONATE 0.12% ORAL RINSE 15 ML: 1.2 LIQUID ORAL at 11:03

## 2019-01-01 RX ADMIN — SODIUM PHOSPHATE, MONOBASIC, MONOHYDRATE 30 MMOL: 276; 142 INJECTION, SOLUTION INTRAVENOUS at 09:01

## 2019-01-01 RX ADMIN — Medication 2000 ML/HR: at 07:00

## 2019-01-01 RX ADMIN — Medication 500 ML/HR: at 19:35

## 2019-01-01 RX ADMIN — CALCIUM GLUCONATE 1 G: 98 INJECTION, SOLUTION INTRAVENOUS at 13:05

## 2019-01-01 RX ADMIN — MORPHINE SULFATE 2 MG: 4 INJECTION INTRAVENOUS at 18:58

## 2019-01-01 RX ADMIN — INSULIN LISPRO 1 UNITS: 100 INJECTION, SOLUTION INTRAVENOUS; SUBCUTANEOUS at 20:00

## 2019-01-01 RX ADMIN — Medication 4 PUFF: at 11:13

## 2019-01-01 RX ADMIN — ATORVASTATIN CALCIUM 20 MG: 10 TABLET, FILM COATED ORAL at 17:18

## 2019-01-01 RX ADMIN — OXYCODONE HYDROCHLORIDE 10 MG: 5 TABLET ORAL at 11:12

## 2019-01-01 RX ADMIN — HYDROCORTISONE SODIUM SUCCINATE 100 MG: 100 INJECTION, POWDER, FOR SOLUTION INTRAMUSCULAR; INTRAVENOUS at 08:52

## 2019-01-01 RX ADMIN — COLLAGENASE SANTYL: 250 OINTMENT TOPICAL at 11:40

## 2019-01-01 RX ADMIN — Medication 2000 ML/HR: at 20:48

## 2019-01-01 RX ADMIN — COLLAGENASE SANTYL: 250 OINTMENT TOPICAL at 06:00

## 2019-01-01 RX ADMIN — HEPARIN SODIUM: 1000 INJECTION, SOLUTION INTRAVENOUS; SUBCUTANEOUS at 05:39

## 2019-01-01 RX ADMIN — CHLORHEXIDINE GLUCONATE 0.12% ORAL RINSE 15 ML: 1.2 LIQUID ORAL at 08:15

## 2019-01-01 RX ADMIN — HYDROCORTISONE SODIUM SUCCINATE 50 MG: 100 INJECTION, POWDER, FOR SOLUTION INTRAMUSCULAR; INTRAVENOUS at 18:03

## 2019-01-01 RX ADMIN — MIDODRINE HYDROCHLORIDE 10 MG: 5 TABLET ORAL at 08:09

## 2019-01-01 RX ADMIN — Medication 500 ML/HR: at 09:16

## 2019-01-01 RX ADMIN — INSULIN LISPRO 1 UNITS: 100 INJECTION, SOLUTION INTRAVENOUS; SUBCUTANEOUS at 15:54

## 2019-01-01 RX ADMIN — MIDODRINE HYDROCHLORIDE 10 MG: 5 TABLET ORAL at 10:04

## 2019-01-01 RX ADMIN — Medication 4 PUFF: at 11:47

## 2019-01-01 RX ADMIN — Medication 4 PUFF: at 16:10

## 2019-01-01 RX ADMIN — FENTANYL CITRATE 25 MCG: 50 INJECTION INTRAMUSCULAR; INTRAVENOUS at 10:42

## 2019-01-01 RX ADMIN — IPRATROPIUM BROMIDE AND ALBUTEROL SULFATE 1 AMPULE: .5; 3 SOLUTION RESPIRATORY (INHALATION) at 16:06

## 2019-01-01 RX ADMIN — MIDAZOLAM HYDROCHLORIDE 2 MG: 1 INJECTION, SOLUTION INTRAMUSCULAR; INTRAVENOUS at 15:49

## 2019-01-01 RX ADMIN — COLLAGENASE SANTYL: 250 OINTMENT TOPICAL at 02:03

## 2019-01-01 RX ADMIN — Medication 4 PUFF: at 15:37

## 2019-01-01 RX ADMIN — Medication 10 ML: at 07:39

## 2019-01-01 RX ADMIN — SODIUM PHOSPHATE, MONOBASIC, MONOHYDRATE 12 MMOL: 276; 142 INJECTION, SOLUTION INTRAVENOUS at 02:41

## 2019-01-01 RX ADMIN — ATORVASTATIN CALCIUM 20 MG: 10 TABLET, FILM COATED ORAL at 16:50

## 2019-01-01 RX ADMIN — HEPARIN SODIUM: 1000 INJECTION, SOLUTION INTRAVENOUS; SUBCUTANEOUS at 14:55

## 2019-01-01 RX ADMIN — SODIUM PHOSPHATE, MONOBASIC, MONOHYDRATE 12 MMOL: 276; 142 INJECTION, SOLUTION INTRAVENOUS at 18:04

## 2019-01-01 RX ADMIN — LORAZEPAM 0.5 MG: 2 INJECTION INTRAMUSCULAR; INTRAVENOUS at 01:29

## 2019-01-01 RX ADMIN — DARBEPOETIN ALFA 25 MCG: 25 INJECTION, SOLUTION INTRAVENOUS; SUBCUTANEOUS at 11:42

## 2019-01-01 RX ADMIN — WARFARIN SODIUM 5 MG: 5 TABLET ORAL at 18:23

## 2019-01-01 RX ADMIN — Medication 1000 ML/HR: at 22:37

## 2019-01-01 RX ADMIN — HYDROCORTISONE SODIUM SUCCINATE 50 MG: 100 INJECTION, POWDER, FOR SOLUTION INTRAMUSCULAR; INTRAVENOUS at 02:16

## 2019-01-01 RX ADMIN — FENTANYL CITRATE 25 MCG: 50 INJECTION INTRAMUSCULAR; INTRAVENOUS at 16:18

## 2019-01-01 RX ADMIN — HYDROCORTISONE SODIUM SUCCINATE 50 MG: 100 INJECTION, POWDER, FOR SOLUTION INTRAMUSCULAR; INTRAVENOUS at 20:50

## 2019-01-01 RX ADMIN — Medication 4 PUFF: at 03:06

## 2019-01-01 RX ADMIN — FENTANYL CITRATE 25 MCG: 50 INJECTION INTRAMUSCULAR; INTRAVENOUS at 18:56

## 2019-01-01 RX ADMIN — Medication 1000 ML/HR: at 17:09

## 2019-01-01 RX ADMIN — Medication 1000 ML/HR: at 03:42

## 2019-01-01 RX ADMIN — NOREPINEPHRINE BITARTRATE 7 MCG/MIN: 1 INJECTION INTRAVENOUS at 22:50

## 2019-01-01 RX ADMIN — CEFEPIME 2 G: 2 INJECTION, POWDER, FOR SOLUTION INTRAVENOUS at 04:19

## 2019-01-01 RX ADMIN — HYDROCORTISONE SODIUM SUCCINATE 50 MG: 100 INJECTION, POWDER, FOR SOLUTION INTRAMUSCULAR; INTRAVENOUS at 17:21

## 2019-01-01 RX ADMIN — Medication 4 PUFF: at 02:26

## 2019-01-01 RX ADMIN — VANCOMYCIN HYDROCHLORIDE 750 MG: 1 INJECTION, POWDER, LYOPHILIZED, FOR SOLUTION INTRAVENOUS at 13:28

## 2019-01-01 RX ADMIN — MIDAZOLAM HYDROCHLORIDE 2 MG: 1 INJECTION, SOLUTION INTRAMUSCULAR; INTRAVENOUS at 04:15

## 2019-01-01 RX ADMIN — FENTANYL CITRATE 25 MCG: 50 INJECTION INTRAMUSCULAR; INTRAVENOUS at 15:21

## 2019-01-01 RX ADMIN — Medication 2000 ML/HR: at 07:01

## 2019-01-01 RX ADMIN — Medication 10 ML: at 08:11

## 2019-01-01 RX ADMIN — SODIUM CHLORIDE 1000 ML: 9 INJECTION, SOLUTION INTRAVENOUS at 11:23

## 2019-01-01 RX ADMIN — Medication 500 ML/HR: at 05:46

## 2019-01-01 RX ADMIN — LORAZEPAM 0.5 MG: 2 INJECTION INTRAMUSCULAR; INTRAVENOUS at 03:38

## 2019-01-01 RX ADMIN — COLISTIMETHATE SODIUM: 150 INJECTION, POWDER, LYOPHILIZED, FOR SOLUTION INTRAMUSCULAR; INTRAVENOUS at 06:37

## 2019-01-01 RX ADMIN — Medication 10 ML: at 11:07

## 2019-01-01 RX ADMIN — CALCIUM GLUCONATE 1 G: 98 INJECTION, SOLUTION INTRAVENOUS at 20:05

## 2019-01-01 RX ADMIN — LORAZEPAM 0.5 MG: 2 INJECTION INTRAMUSCULAR; INTRAVENOUS at 03:45

## 2019-01-01 RX ADMIN — HEPARIN SODIUM 2100 UNITS: 1000 INJECTION, SOLUTION INTRAVENOUS; SUBCUTANEOUS at 15:16

## 2019-01-01 RX ADMIN — Medication 10 ML: at 10:04

## 2019-01-01 RX ADMIN — CHLORHEXIDINE GLUCONATE 0.12% ORAL RINSE 15 ML: 1.2 LIQUID ORAL at 07:39

## 2019-01-01 RX ADMIN — Medication 10 ML: at 19:50

## 2019-01-01 RX ADMIN — MIDAZOLAM HYDROCHLORIDE 2 MG: 1 INJECTION, SOLUTION INTRAMUSCULAR; INTRAVENOUS at 01:42

## 2019-01-01 RX ADMIN — METOPROLOL TARTRATE 12.5 MG: 25 TABLET ORAL at 11:03

## 2019-01-01 RX ADMIN — Medication 1500 ML/HR: at 06:05

## 2019-01-01 RX ADMIN — HEPARIN SODIUM: 1000 INJECTION, SOLUTION INTRAVENOUS; SUBCUTANEOUS at 23:01

## 2019-01-01 RX ADMIN — INSULIN LISPRO 1 UNITS: 100 INJECTION, SOLUTION INTRAVENOUS; SUBCUTANEOUS at 04:05

## 2019-01-01 RX ADMIN — Medication 4 PUFF: at 23:27

## 2019-01-01 RX ADMIN — HYDROCORTISONE SODIUM SUCCINATE 50 MG: 100 INJECTION, POWDER, FOR SOLUTION INTRAMUSCULAR; INTRAVENOUS at 16:56

## 2019-01-01 RX ADMIN — OXYCODONE HYDROCHLORIDE 10 MG: 5 TABLET ORAL at 14:22

## 2019-01-01 RX ADMIN — Medication 1000 ML/HR: at 14:39

## 2019-01-01 RX ADMIN — INSULIN LISPRO 1 UNITS: 100 INJECTION, SOLUTION INTRAVENOUS; SUBCUTANEOUS at 20:47

## 2019-01-01 RX ADMIN — HYDROCORTISONE SODIUM SUCCINATE 50 MG: 100 INJECTION, POWDER, FOR SOLUTION INTRAMUSCULAR; INTRAVENOUS at 08:22

## 2019-01-01 RX ADMIN — IPRATROPIUM BROMIDE AND ALBUTEROL SULFATE 1 AMPULE: .5; 3 SOLUTION RESPIRATORY (INHALATION) at 15:47

## 2019-01-01 RX ADMIN — POTASSIUM PHOSPHATE, MONOBASIC AND POTASSIUM PHOSPHATE, DIBASIC 15 MMOL: 224; 236 INJECTION, SOLUTION INTRAVENOUS at 10:07

## 2019-01-01 RX ADMIN — Medication 4 PUFF: at 10:54

## 2019-01-01 RX ADMIN — CALCIUM GLUCONATE 1 G: 98 INJECTION, SOLUTION INTRAVENOUS at 01:14

## 2019-01-01 RX ADMIN — VANCOMYCIN HYDROCHLORIDE 500 MG: 500 INJECTION, POWDER, LYOPHILIZED, FOR SOLUTION INTRAVENOUS at 16:26

## 2019-01-01 RX ADMIN — WARFARIN SODIUM 2 MG: 2 TABLET ORAL at 17:34

## 2019-01-01 RX ADMIN — INSULIN LISPRO 1 UNITS: 100 INJECTION, SOLUTION INTRAVENOUS; SUBCUTANEOUS at 09:16

## 2019-01-01 RX ADMIN — Medication 2000 ML/HR: at 06:12

## 2019-01-01 RX ADMIN — Medication 1000 ML/HR: at 08:50

## 2019-01-01 RX ADMIN — Medication 4 PUFF: at 23:51

## 2019-01-01 RX ADMIN — Medication 4 PUFF: at 22:44

## 2019-01-01 RX ADMIN — Medication 2000 ML/HR: at 23:54

## 2019-01-01 RX ADMIN — MIDODRINE HYDROCHLORIDE 10 MG: 5 TABLET ORAL at 22:42

## 2019-01-01 RX ADMIN — FENTANYL CITRATE 25 MCG: 50 INJECTION INTRAMUSCULAR; INTRAVENOUS at 07:38

## 2019-01-01 RX ADMIN — INSULIN LISPRO 1 UNITS: 100 INJECTION, SOLUTION INTRAVENOUS; SUBCUTANEOUS at 11:42

## 2019-01-01 RX ADMIN — VASOPRESSIN 0.04 UNITS/MIN: 20 INJECTION INTRAVENOUS at 16:13

## 2019-01-01 RX ADMIN — IPRATROPIUM BROMIDE AND ALBUTEROL SULFATE 1 AMPULE: .5; 3 SOLUTION RESPIRATORY (INHALATION) at 15:49

## 2019-01-01 RX ADMIN — MIDAZOLAM HYDROCHLORIDE 2 MG: 1 INJECTION, SOLUTION INTRAMUSCULAR; INTRAVENOUS at 20:21

## 2019-01-01 RX ADMIN — SODIUM PHOSPHATE, MONOBASIC, MONOHYDRATE 12 MMOL: 276; 142 INJECTION, SOLUTION INTRAVENOUS at 08:23

## 2019-01-01 RX ADMIN — Medication 10 ML: at 09:45

## 2019-01-01 RX ADMIN — INSULIN LISPRO 1 UNITS: 100 INJECTION, SOLUTION INTRAVENOUS; SUBCUTANEOUS at 00:16

## 2019-01-01 RX ADMIN — CHLORHEXIDINE GLUCONATE 0.12% ORAL RINSE 15 ML: 1.2 LIQUID ORAL at 10:03

## 2019-01-01 RX ADMIN — FENTANYL CITRATE 25 MCG: 50 INJECTION INTRAMUSCULAR; INTRAVENOUS at 14:01

## 2019-01-01 RX ADMIN — Medication 1000 ML/HR: at 13:59

## 2019-01-01 RX ADMIN — ALBUMIN (HUMAN) 12.5 G: 0.25 INJECTION, SOLUTION INTRAVENOUS at 13:58

## 2019-01-01 RX ADMIN — MUPIROCIN: 20 OINTMENT TOPICAL at 08:29

## 2019-01-01 RX ADMIN — MIDAZOLAM HYDROCHLORIDE 2 MG: 1 INJECTION, SOLUTION INTRAMUSCULAR; INTRAVENOUS at 18:20

## 2019-01-01 RX ADMIN — INSULIN LISPRO 1 UNITS: 100 INJECTION, SOLUTION INTRAVENOUS; SUBCUTANEOUS at 04:26

## 2019-01-01 RX ADMIN — MUPIROCIN: 20 OINTMENT TOPICAL at 09:57

## 2019-01-01 RX ADMIN — Medication 10 ML: at 21:05

## 2019-01-01 RX ADMIN — HYDROCORTISONE SODIUM SUCCINATE 50 MG: 100 INJECTION, POWDER, FOR SOLUTION INTRAMUSCULAR; INTRAVENOUS at 06:07

## 2019-01-01 RX ADMIN — NOREPINEPHRINE BITARTRATE 5 MCG/MIN: 1 INJECTION INTRAVENOUS at 19:53

## 2019-01-01 RX ADMIN — LORAZEPAM 0.5 MG: 2 INJECTION INTRAMUSCULAR; INTRAVENOUS at 20:36

## 2019-01-01 RX ADMIN — MEROPENEM 1 G: 1 INJECTION, POWDER, FOR SOLUTION INTRAVENOUS at 16:10

## 2019-01-01 RX ADMIN — Medication 4 PUFF: at 02:20

## 2019-01-01 RX ADMIN — MIDODRINE HYDROCHLORIDE 10 MG: 5 TABLET ORAL at 17:18

## 2019-01-01 RX ADMIN — HYDROCORTISONE SODIUM SUCCINATE 50 MG: 100 INJECTION, POWDER, FOR SOLUTION INTRAMUSCULAR; INTRAVENOUS at 02:45

## 2019-01-01 RX ADMIN — Medication 4 PUFF: at 22:47

## 2019-01-01 RX ADMIN — CHLORHEXIDINE GLUCONATE 0.12% ORAL RINSE 15 ML: 1.2 LIQUID ORAL at 21:38

## 2019-01-01 RX ADMIN — FENTANYL CITRATE 25 MCG: 50 INJECTION INTRAMUSCULAR; INTRAVENOUS at 16:43

## 2019-01-01 RX ADMIN — HYDROCORTISONE SODIUM SUCCINATE 50 MG: 100 INJECTION, POWDER, FOR SOLUTION INTRAMUSCULAR; INTRAVENOUS at 00:41

## 2019-01-01 RX ADMIN — Medication 4 PUFF: at 18:47

## 2019-01-01 RX ADMIN — Medication 4 PUFF: at 19:08

## 2019-01-01 RX ADMIN — EPOETIN ALFA-EPBX 5000 UNITS: 10000 INJECTION, SOLUTION INTRAVENOUS; SUBCUTANEOUS at 11:59

## 2019-01-01 RX ADMIN — Medication 1000 ML/HR: at 11:55

## 2019-01-01 RX ADMIN — CHLORHEXIDINE GLUCONATE 0.12% ORAL RINSE 15 ML: 1.2 LIQUID ORAL at 20:56

## 2019-01-01 RX ADMIN — Medication 2000 ML/HR: at 09:03

## 2019-01-01 RX ADMIN — FENTANYL CITRATE 25 MCG: 50 INJECTION INTRAMUSCULAR; INTRAVENOUS at 13:29

## 2019-01-01 RX ADMIN — FENTANYL CITRATE 25 MCG: 50 INJECTION INTRAMUSCULAR; INTRAVENOUS at 21:45

## 2019-01-01 RX ADMIN — FENTANYL CITRATE 25 MCG: 50 INJECTION INTRAMUSCULAR; INTRAVENOUS at 17:40

## 2019-01-01 RX ADMIN — MIDAZOLAM HYDROCHLORIDE 2 MG: 1 INJECTION, SOLUTION INTRAMUSCULAR; INTRAVENOUS at 05:52

## 2019-01-01 RX ADMIN — Medication 10 ML: at 20:45

## 2019-01-01 RX ADMIN — CHLORHEXIDINE GLUCONATE 0.12% ORAL RINSE 15 ML: 1.2 LIQUID ORAL at 20:57

## 2019-01-01 RX ADMIN — HYDROCORTISONE SODIUM SUCCINATE 50 MG: 100 INJECTION, POWDER, FOR SOLUTION INTRAMUSCULAR; INTRAVENOUS at 07:38

## 2019-01-01 RX ADMIN — HYDROCORTISONE SODIUM SUCCINATE 50 MG: 100 INJECTION, POWDER, FOR SOLUTION INTRAMUSCULAR; INTRAVENOUS at 16:52

## 2019-01-01 RX ADMIN — Medication 4 PUFF: at 02:34

## 2019-01-01 RX ADMIN — Medication 1500 ML/HR: at 09:40

## 2019-01-01 RX ADMIN — Medication 1000 ML/HR: at 00:48

## 2019-01-01 RX ADMIN — WARFARIN SODIUM 3 MG: 1 TABLET ORAL at 17:28

## 2019-01-01 RX ADMIN — Medication 1000 ML/HR: at 03:43

## 2019-01-01 RX ADMIN — Medication 4 PUFF: at 23:12

## 2019-01-01 RX ADMIN — SODIUM PHOSPHATE, MONOBASIC, MONOHYDRATE 12 MMOL: 276; 142 INJECTION, SOLUTION INTRAVENOUS at 18:15

## 2019-01-01 RX ADMIN — PHYTONADIONE 10 MG: 10 INJECTION, EMULSION INTRAMUSCULAR; INTRAVENOUS; SUBCUTANEOUS at 12:41

## 2019-01-01 RX ADMIN — CEFEPIME HYDROCHLORIDE 500 MG: 1 INJECTION, POWDER, FOR SOLUTION INTRAMUSCULAR; INTRAVENOUS at 15:43

## 2019-01-01 RX ADMIN — SODIUM PHOSPHATE, MONOBASIC, MONOHYDRATE 6 MMOL: 276; 142 INJECTION, SOLUTION INTRAVENOUS at 17:06

## 2019-01-01 RX ADMIN — TRAMADOL HYDROCHLORIDE 25 MG: 50 TABLET, FILM COATED ORAL at 14:05

## 2019-01-01 RX ADMIN — NOREPINEPHRINE BITARTRATE 22 MCG/MIN: 1 INJECTION INTRAVENOUS at 05:08

## 2019-01-01 RX ADMIN — COLLAGENASE SANTYL: 250 OINTMENT TOPICAL at 08:10

## 2019-01-01 RX ADMIN — Medication 10 ML: at 08:25

## 2019-01-01 RX ADMIN — CALCIUM GLUCONATE 1 G: 98 INJECTION, SOLUTION INTRAVENOUS at 13:01

## 2019-01-01 RX ADMIN — INSULIN LISPRO 1 UNITS: 100 INJECTION, SOLUTION INTRAVENOUS; SUBCUTANEOUS at 03:27

## 2019-01-01 RX ADMIN — MIDAZOLAM HYDROCHLORIDE 2 MG: 1 INJECTION, SOLUTION INTRAMUSCULAR; INTRAVENOUS at 19:53

## 2019-01-01 RX ADMIN — VANCOMYCIN HYDROCHLORIDE 1000 MG: 1 INJECTION, POWDER, LYOPHILIZED, FOR SOLUTION INTRAVENOUS at 16:00

## 2019-01-01 RX ADMIN — HEPARIN SODIUM: 1000 INJECTION, SOLUTION INTRAVENOUS; SUBCUTANEOUS at 09:44

## 2019-01-01 RX ADMIN — Medication 1000 ML/HR: at 17:06

## 2019-01-01 RX ADMIN — MIDAZOLAM HYDROCHLORIDE 2 MG: 1 INJECTION, SOLUTION INTRAMUSCULAR; INTRAVENOUS at 04:47

## 2019-01-01 RX ADMIN — FENTANYL CITRATE 25 MCG: 50 INJECTION INTRAMUSCULAR; INTRAVENOUS at 11:36

## 2019-01-01 RX ADMIN — MIDAZOLAM HYDROCHLORIDE 2 MG: 1 INJECTION, SOLUTION INTRAMUSCULAR; INTRAVENOUS at 22:51

## 2019-01-01 RX ADMIN — MIDAZOLAM HYDROCHLORIDE 2 MG: 1 INJECTION, SOLUTION INTRAMUSCULAR; INTRAVENOUS at 21:04

## 2019-01-01 RX ADMIN — FENTANYL CITRATE 25 MCG: 50 INJECTION INTRAMUSCULAR; INTRAVENOUS at 09:43

## 2019-01-01 RX ADMIN — MIDAZOLAM HYDROCHLORIDE 2 MG: 1 INJECTION, SOLUTION INTRAMUSCULAR; INTRAVENOUS at 12:39

## 2019-01-01 RX ADMIN — Medication 2000 ML/HR: at 18:15

## 2019-01-01 RX ADMIN — COLLAGENASE SANTYL: 250 OINTMENT TOPICAL at 08:36

## 2019-01-01 RX ADMIN — SODIUM CHLORIDE 1000 ML: 9 INJECTION, SOLUTION INTRAVENOUS at 07:21

## 2019-01-01 RX ADMIN — NOREPINEPHRINE BITARTRATE 2 MCG/MIN: 1 INJECTION INTRAVENOUS at 07:57

## 2019-01-01 RX ADMIN — FENTANYL CITRATE 100 MCG: 50 INJECTION INTRAMUSCULAR; INTRAVENOUS at 17:20

## 2019-01-01 RX ADMIN — Medication 4 PUFF: at 11:39

## 2019-01-01 RX ADMIN — MIDODRINE HYDROCHLORIDE 10 MG: 5 TABLET ORAL at 08:11

## 2019-01-01 RX ADMIN — HEPARIN SODIUM 4500 UNITS: 1000 INJECTION, SOLUTION INTRAVENOUS; SUBCUTANEOUS at 12:40

## 2019-01-01 RX ADMIN — VASOPRESSIN 0.04 UNITS/MIN: 20 INJECTION INTRAVENOUS at 07:30

## 2019-01-01 RX ADMIN — COLLAGENASE SANTYL: 250 OINTMENT TOPICAL at 16:09

## 2019-01-01 RX ADMIN — HYDROCORTISONE SODIUM SUCCINATE 50 MG: 100 INJECTION, POWDER, FOR SOLUTION INTRAMUSCULAR; INTRAVENOUS at 15:48

## 2019-01-01 RX ADMIN — OXYCODONE HYDROCHLORIDE 5 MG: 5 TABLET ORAL at 21:04

## 2019-01-01 RX ADMIN — HEPARIN SODIUM 4500 UNITS: 1000 INJECTION, SOLUTION INTRAVENOUS; SUBCUTANEOUS at 11:10

## 2019-01-01 RX ADMIN — SODIUM PHOSPHATE, MONOBASIC, MONOHYDRATE 12 MMOL: 276; 142 INJECTION, SOLUTION INTRAVENOUS at 02:46

## 2019-01-01 RX ADMIN — NOREPINEPHRINE BITARTRATE 7 MCG/MIN: 1 INJECTION INTRAVENOUS at 20:49

## 2019-01-01 RX ADMIN — SODIUM CHLORIDE 1000 ML: 9 INJECTION, SOLUTION INTRAVENOUS at 16:27

## 2019-01-01 RX ADMIN — NOREPINEPHRINE BITARTRATE 3 MCG/MIN: 1 INJECTION INTRAVENOUS at 18:24

## 2019-01-01 RX ADMIN — HYDROCORTISONE SODIUM SUCCINATE 50 MG: 100 INJECTION, POWDER, FOR SOLUTION INTRAMUSCULAR; INTRAVENOUS at 21:41

## 2019-01-01 RX ADMIN — LORAZEPAM 0.5 MG: 2 INJECTION INTRAMUSCULAR; INTRAVENOUS at 12:45

## 2019-01-01 RX ADMIN — FENTANYL CITRATE 25 MCG: 50 INJECTION INTRAMUSCULAR; INTRAVENOUS at 00:39

## 2019-01-01 RX ADMIN — Medication 10 ML: at 00:39

## 2019-01-01 RX ADMIN — LORAZEPAM 0.5 MG: 2 INJECTION INTRAMUSCULAR; INTRAVENOUS at 04:51

## 2019-01-01 RX ADMIN — Medication 10 ML: at 22:38

## 2019-01-01 RX ADMIN — HYDROCORTISONE SODIUM SUCCINATE 50 MG: 100 INJECTION, POWDER, FOR SOLUTION INTRAMUSCULAR; INTRAVENOUS at 08:31

## 2019-01-01 RX ADMIN — SODIUM CHLORIDE 1000 ML: 9 INJECTION, SOLUTION INTRAVENOUS at 14:40

## 2019-01-01 RX ADMIN — INSULIN LISPRO 2 UNITS: 100 INJECTION, SOLUTION INTRAVENOUS; SUBCUTANEOUS at 20:21

## 2019-01-01 RX ADMIN — MUPIROCIN: 20 OINTMENT TOPICAL at 08:27

## 2019-01-01 RX ADMIN — Medication 4 PUFF: at 11:24

## 2019-01-01 RX ADMIN — COLLAGENASE SANTYL: 250 OINTMENT TOPICAL at 07:42

## 2019-01-01 RX ADMIN — INSULIN LISPRO 2 UNITS: 100 INJECTION, SOLUTION INTRAVENOUS; SUBCUTANEOUS at 16:31

## 2019-01-01 RX ADMIN — FENTANYL CITRATE 25 MCG: 50 INJECTION INTRAMUSCULAR; INTRAVENOUS at 03:16

## 2019-01-01 RX ADMIN — SODIUM PHOSPHATE, MONOBASIC, MONOHYDRATE 12 MMOL: 276; 142 INJECTION, SOLUTION INTRAVENOUS at 18:39

## 2019-01-01 RX ADMIN — VANCOMYCIN HYDROCHLORIDE 750 MG: 1 INJECTION, POWDER, LYOPHILIZED, FOR SOLUTION INTRAVENOUS at 16:10

## 2019-01-01 RX ADMIN — SODIUM PHOSPHATE, MONOBASIC, MONOHYDRATE 6 MMOL: 276; 142 INJECTION, SOLUTION INTRAVENOUS at 01:44

## 2019-01-01 RX ADMIN — Medication 4 PUFF: at 15:49

## 2019-01-01 RX ADMIN — Medication 2000 ML/HR: at 23:07

## 2019-01-01 RX ADMIN — CEFEPIME 2 G: 2 INJECTION, POWDER, FOR SOLUTION INTRAVENOUS at 04:34

## 2019-01-01 RX ADMIN — Medication 4 PUFF: at 22:38

## 2019-01-01 RX ADMIN — INSULIN LISPRO 1 UNITS: 100 INJECTION, SOLUTION INTRAVENOUS; SUBCUTANEOUS at 08:23

## 2019-01-01 RX ADMIN — Medication 4 PUFF: at 15:56

## 2019-01-01 RX ADMIN — Medication 10 ML: at 20:21

## 2019-01-01 RX ADMIN — CEFEPIME 2 G: 2 INJECTION, POWDER, FOR SOLUTION INTRAVENOUS at 16:35

## 2019-01-01 RX ADMIN — CHLORHEXIDINE GLUCONATE 0.12% ORAL RINSE 15 ML: 1.2 LIQUID ORAL at 19:53

## 2019-01-01 RX ADMIN — IPRATROPIUM BROMIDE AND ALBUTEROL SULFATE 1 AMPULE: .5; 3 SOLUTION RESPIRATORY (INHALATION) at 15:23

## 2019-01-01 RX ADMIN — SODIUM CHLORIDE 250 ML: 9 INJECTION, SOLUTION INTRAVENOUS at 17:13

## 2019-01-01 RX ADMIN — FAMOTIDINE 20 MG: 10 INJECTION, SOLUTION INTRAVENOUS at 11:03

## 2019-01-01 RX ADMIN — HEPARIN SODIUM: 1000 INJECTION, SOLUTION INTRAVENOUS; SUBCUTANEOUS at 20:18

## 2019-01-01 RX ADMIN — IPRATROPIUM BROMIDE AND ALBUTEROL SULFATE 1 AMPULE: .5; 3 SOLUTION RESPIRATORY (INHALATION) at 06:46

## 2019-01-01 RX ADMIN — HYDROCORTISONE SODIUM SUCCINATE 50 MG: 100 INJECTION, POWDER, FOR SOLUTION INTRAMUSCULAR; INTRAVENOUS at 14:27

## 2019-01-01 RX ADMIN — Medication 2000 ML/HR: at 00:18

## 2019-01-01 RX ADMIN — MORPHINE SULFATE 2 MG: 4 INJECTION INTRAVENOUS at 17:07

## 2019-01-01 RX ADMIN — SODIUM CHLORIDE 250 ML: 9 INJECTION, SOLUTION INTRAVENOUS at 20:00

## 2019-01-01 RX ADMIN — MIDAZOLAM HYDROCHLORIDE 2 MG: 1 INJECTION, SOLUTION INTRAMUSCULAR; INTRAVENOUS at 21:25

## 2019-01-01 RX ADMIN — INSULIN LISPRO 1 UNITS: 100 INJECTION, SOLUTION INTRAVENOUS; SUBCUTANEOUS at 23:41

## 2019-01-01 RX ADMIN — Medication 1000 ML/HR: at 20:51

## 2019-01-01 RX ADMIN — FAMOTIDINE 20 MG: 10 INJECTION, SOLUTION INTRAVENOUS at 10:05

## 2019-01-01 RX ADMIN — METOPROLOL TARTRATE 12.5 MG: 25 TABLET ORAL at 13:05

## 2019-01-01 RX ADMIN — Medication 10 ML: at 23:42

## 2019-01-01 RX ADMIN — Medication 4 PUFF: at 03:43

## 2019-01-01 RX ADMIN — Medication 10 ML: at 07:32

## 2019-01-01 RX ADMIN — MEROPENEM 500 MG: 500 INJECTION, POWDER, FOR SOLUTION INTRAVENOUS at 17:20

## 2019-01-01 RX ADMIN — Medication 1500 ML/HR: at 02:37

## 2019-01-01 RX ADMIN — MUPIROCIN: 20 OINTMENT TOPICAL at 08:23

## 2019-01-01 RX ADMIN — POTASSIUM CHLORIDE 20 MEQ: 400 INJECTION, SOLUTION INTRAVENOUS at 02:24

## 2019-01-01 RX ADMIN — COLISTIMETHATE SODIUM: 150 INJECTION, POWDER, LYOPHILIZED, FOR SOLUTION INTRAMUSCULAR; INTRAVENOUS at 22:38

## 2019-01-01 RX ADMIN — HYDROCORTISONE SODIUM SUCCINATE 50 MG: 100 INJECTION, POWDER, FOR SOLUTION INTRAMUSCULAR; INTRAVENOUS at 02:43

## 2019-01-01 RX ADMIN — WARFARIN SODIUM 3 MG: 1 TABLET ORAL at 18:16

## 2019-01-01 RX ADMIN — MIDODRINE HYDROCHLORIDE 10 MG: 5 TABLET ORAL at 16:51

## 2019-01-01 RX ADMIN — Medication 10 ML: at 08:38

## 2019-01-01 RX ADMIN — FENTANYL CITRATE 25 MCG: 50 INJECTION INTRAMUSCULAR; INTRAVENOUS at 22:51

## 2019-01-01 RX ADMIN — VASOPRESSIN 0.04 UNITS/MIN: 20 INJECTION INTRAVENOUS at 19:22

## 2019-01-01 RX ADMIN — NOREPINEPHRINE BITARTRATE 4 MCG/MIN: 1 INJECTION INTRAVENOUS at 06:37

## 2019-01-01 RX ADMIN — HYDROCORTISONE SODIUM SUCCINATE 50 MG: 100 INJECTION, POWDER, FOR SOLUTION INTRAMUSCULAR; INTRAVENOUS at 02:10

## 2019-01-01 RX ADMIN — VASOPRESSIN 0.04 UNITS/MIN: 20 INJECTION INTRAVENOUS at 05:12

## 2019-01-01 RX ADMIN — MIDODRINE HYDROCHLORIDE 10 MG: 5 TABLET ORAL at 11:03

## 2019-01-01 RX ADMIN — Medication 500 ML/HR: at 17:06

## 2019-01-01 RX ADMIN — SODIUM PHOSPHATE, MONOBASIC, MONOHYDRATE 12 MMOL: 276; 142 INJECTION, SOLUTION INTRAVENOUS at 18:38

## 2019-01-01 RX ADMIN — SODIUM PHOSPHATE, MONOBASIC, MONOHYDRATE 12 MMOL: 276; 142 INJECTION, SOLUTION INTRAVENOUS at 10:24

## 2019-01-01 RX ADMIN — MORPHINE SULFATE 2 MG: 4 INJECTION INTRAVENOUS at 10:15

## 2019-01-01 RX ADMIN — SODIUM CHLORIDE 250 ML: 9 INJECTION, SOLUTION INTRAVENOUS at 03:59

## 2019-01-01 RX ADMIN — MUPIROCIN: 20 OINTMENT TOPICAL at 20:21

## 2019-01-01 RX ADMIN — POTASSIUM PHOSPHATE, MONOBASIC AND POTASSIUM PHOSPHATE, DIBASIC 10 MMOL: 224; 236 INJECTION, SOLUTION INTRAVENOUS at 10:57

## 2019-01-01 RX ADMIN — HEPARIN SODIUM 2200 UNITS: 1000 INJECTION, SOLUTION INTRAVENOUS; SUBCUTANEOUS at 15:23

## 2019-01-01 RX ADMIN — CALCIUM GLUCONATE 1 G: 98 INJECTION, SOLUTION INTRAVENOUS at 17:06

## 2019-01-01 RX ADMIN — Medication 1000 ML/HR: at 13:54

## 2019-01-01 RX ADMIN — VASOPRESSIN 0.04 UNITS/MIN: 20 INJECTION INTRAVENOUS at 13:43

## 2019-01-01 RX ADMIN — CHLORHEXIDINE GLUCONATE 0.12% ORAL RINSE 15 ML: 1.2 LIQUID ORAL at 20:19

## 2019-01-01 RX ADMIN — Medication 2000 ML/HR: at 02:03

## 2019-01-01 RX ADMIN — VANCOMYCIN HYDROCHLORIDE 1250 MG: 1 INJECTION, POWDER, LYOPHILIZED, FOR SOLUTION INTRAVENOUS at 09:04

## 2019-01-01 RX ADMIN — Medication 2000 ML/HR: at 14:39

## 2019-01-01 RX ADMIN — OXYCODONE HYDROCHLORIDE 10 MG: 5 TABLET ORAL at 08:12

## 2019-01-01 RX ADMIN — EPINEPHRINE 1 MCG/MIN: 1 INJECTION PARENTERAL at 08:15

## 2019-01-01 RX ADMIN — HYDROCORTISONE SODIUM SUCCINATE 50 MG: 100 INJECTION, POWDER, FOR SOLUTION INTRAMUSCULAR; INTRAVENOUS at 06:37

## 2019-01-01 RX ADMIN — METOPROLOL TARTRATE 12.5 MG: 25 TABLET ORAL at 08:12

## 2019-01-01 RX ADMIN — Medication 10 ML: at 20:10

## 2019-01-01 RX ADMIN — Medication 1000 ML/HR: at 14:41

## 2019-01-01 RX ADMIN — Medication 4 PUFF: at 10:31

## 2019-01-01 RX ADMIN — HYDROCORTISONE SODIUM SUCCINATE 50 MG: 100 INJECTION, POWDER, FOR SOLUTION INTRAMUSCULAR; INTRAVENOUS at 11:24

## 2019-01-01 RX ADMIN — FAMOTIDINE 20 MG: 10 INJECTION, SOLUTION INTRAVENOUS at 09:38

## 2019-01-01 RX ADMIN — Medication 2000 ML/HR: at 04:19

## 2019-01-01 RX ADMIN — NOREPINEPHRINE BITARTRATE 6 MCG/MIN: 1 INJECTION INTRAVENOUS at 07:26

## 2019-01-01 RX ADMIN — HYDROCORTISONE SODIUM SUCCINATE 50 MG: 100 INJECTION, POWDER, FOR SOLUTION INTRAMUSCULAR; INTRAVENOUS at 18:23

## 2019-01-01 RX ADMIN — LORAZEPAM 0.5 MG: 2 INJECTION INTRAMUSCULAR; INTRAVENOUS at 01:21

## 2019-01-01 RX ADMIN — LORAZEPAM 0.5 MG: 2 INJECTION INTRAMUSCULAR; INTRAVENOUS at 20:56

## 2019-01-01 RX ADMIN — HYDROCORTISONE SODIUM SUCCINATE 50 MG: 100 INJECTION, POWDER, FOR SOLUTION INTRAMUSCULAR; INTRAVENOUS at 06:06

## 2019-01-01 RX ADMIN — Medication 1000 ML/HR: at 20:49

## 2019-01-01 RX ADMIN — MIDAZOLAM HYDROCHLORIDE 2 MG: 1 INJECTION, SOLUTION INTRAMUSCULAR; INTRAVENOUS at 14:55

## 2019-01-01 RX ADMIN — Medication 1000 ML/HR: at 14:24

## 2019-01-01 RX ADMIN — Medication 10 ML: at 08:19

## 2019-01-01 RX ADMIN — MORPHINE SULFATE 2 MG: 4 INJECTION INTRAVENOUS at 17:56

## 2019-01-01 RX ADMIN — CHLORHEXIDINE GLUCONATE 0.12% ORAL RINSE 15 ML: 1.2 LIQUID ORAL at 08:09

## 2019-01-01 RX ADMIN — HYDROCORTISONE SODIUM SUCCINATE 50 MG: 100 INJECTION, POWDER, FOR SOLUTION INTRAMUSCULAR; INTRAVENOUS at 17:05

## 2019-01-01 RX ADMIN — MIDAZOLAM HYDROCHLORIDE 2 MG: 1 INJECTION, SOLUTION INTRAMUSCULAR; INTRAVENOUS at 04:09

## 2019-01-01 RX ADMIN — COLISTIMETHATE SODIUM: 150 INJECTION, POWDER, LYOPHILIZED, FOR SOLUTION INTRAMUSCULAR; INTRAVENOUS at 18:25

## 2019-01-01 RX ADMIN — CEFEPIME 2 G: 2 INJECTION, POWDER, FOR SOLUTION INTRAVENOUS at 15:37

## 2019-01-01 RX ADMIN — MEROPENEM 1 G: 1 INJECTION, POWDER, FOR SOLUTION INTRAVENOUS at 17:18

## 2019-01-01 RX ADMIN — CALCIUM GLUCONATE 1 G: 98 INJECTION, SOLUTION INTRAVENOUS at 19:43

## 2019-01-01 RX ADMIN — Medication 4 PUFF: at 07:42

## 2019-01-01 RX ADMIN — WARFARIN SODIUM 5 MG: 5 TABLET ORAL at 16:56

## 2019-01-01 RX ADMIN — Medication 500 ML/HR: at 03:42

## 2019-01-01 RX ADMIN — HEPARIN SODIUM: 1000 INJECTION, SOLUTION INTRAVENOUS; SUBCUTANEOUS at 07:40

## 2019-01-01 RX ADMIN — VASOPRESSIN 0.04 UNITS/MIN: 20 INJECTION INTRAVENOUS at 04:39

## 2019-01-01 RX ADMIN — NOREPINEPHRINE BITARTRATE 30 MCG/MIN: 1 INJECTION INTRAVENOUS at 17:28

## 2019-01-01 RX ADMIN — CHLORHEXIDINE GLUCONATE 0.12% ORAL RINSE 15 ML: 1.2 LIQUID ORAL at 19:50

## 2019-01-01 RX ADMIN — WARFARIN SODIUM 3 MG: 1 TABLET ORAL at 17:50

## 2019-01-01 RX ADMIN — NOREPINEPHRINE BITARTRATE 7 MCG/MIN: 1 INJECTION INTRAVENOUS at 07:54

## 2019-01-01 RX ADMIN — Medication 1500 ML/HR: at 23:07

## 2019-01-01 RX ADMIN — HYDROCORTISONE SODIUM SUCCINATE 50 MG: 100 INJECTION, POWDER, FOR SOLUTION INTRAMUSCULAR; INTRAVENOUS at 08:21

## 2019-01-01 RX ADMIN — FENTANYL CITRATE 25 MCG: 50 INJECTION INTRAMUSCULAR; INTRAVENOUS at 08:15

## 2019-01-01 RX ADMIN — HYDROCORTISONE SODIUM SUCCINATE 50 MG: 100 INJECTION, POWDER, FOR SOLUTION INTRAMUSCULAR; INTRAVENOUS at 20:20

## 2019-01-01 RX ADMIN — Medication 4 PUFF: at 11:21

## 2019-01-01 RX ADMIN — FENTANYL CITRATE 25 MCG: 50 INJECTION INTRAMUSCULAR; INTRAVENOUS at 15:14

## 2019-01-01 RX ADMIN — WARFARIN SODIUM 5 MG: 5 TABLET ORAL at 16:51

## 2019-01-01 RX ADMIN — FENTANYL CITRATE 25 MCG: 50 INJECTION INTRAMUSCULAR; INTRAVENOUS at 08:09

## 2019-01-01 RX ADMIN — FAMOTIDINE 20 MG: 10 INJECTION, SOLUTION INTRAVENOUS at 09:44

## 2019-01-01 RX ADMIN — Medication 2000 ML/HR: at 04:01

## 2019-01-01 RX ADMIN — INSULIN LISPRO 1 UNITS: 100 INJECTION, SOLUTION INTRAVENOUS; SUBCUTANEOUS at 08:26

## 2019-01-01 RX ADMIN — WARFARIN SODIUM 2 MG: 2 TABLET ORAL at 17:16

## 2019-01-01 RX ADMIN — WARFARIN SODIUM 3 MG: 1 TABLET ORAL at 17:05

## 2019-01-01 RX ADMIN — CALCIUM GLUCONATE 1 G: 98 INJECTION, SOLUTION INTRAVENOUS at 18:15

## 2019-01-01 RX ADMIN — VASOPRESSIN 0.04 UNITS/MIN: 20 INJECTION INTRAVENOUS at 19:33

## 2019-01-01 RX ADMIN — VASOPRESSIN 0.04 UNITS/MIN: 20 INJECTION INTRAVENOUS at 01:14

## 2019-01-01 RX ADMIN — HYDROCORTISONE SODIUM SUCCINATE 50 MG: 100 INJECTION, POWDER, FOR SOLUTION INTRAMUSCULAR; INTRAVENOUS at 08:19

## 2019-01-01 RX ADMIN — CHLORHEXIDINE GLUCONATE 0.12% ORAL RINSE 15 ML: 1.2 LIQUID ORAL at 08:22

## 2019-01-01 RX ADMIN — VANCOMYCIN HYDROCHLORIDE 1000 MG: 1 INJECTION, POWDER, LYOPHILIZED, FOR SOLUTION INTRAVENOUS at 10:01

## 2019-01-01 RX ADMIN — CALCIUM GLUCONATE 1 G: 98 INJECTION, SOLUTION INTRAVENOUS at 08:39

## 2019-01-01 RX ADMIN — Medication 1500 ML/HR: at 19:49

## 2019-01-01 RX ADMIN — WARFARIN SODIUM 3 MG: 1 TABLET ORAL at 18:09

## 2019-01-01 ASSESSMENT — PULMONARY FUNCTION TESTS
PIF_VALUE: 28
PIF_VALUE: 15
PIF_VALUE: 14
PIF_VALUE: 11
PIF_VALUE: 30
PIF_VALUE: 31
PIF_VALUE: 39
PIF_VALUE: 32
PIF_VALUE: 20
PIF_VALUE: 26
PIF_VALUE: 30
PIF_VALUE: 11
PIF_VALUE: 31
PIF_VALUE: 25
PIF_VALUE: 45
PIF_VALUE: 39
PIF_VALUE: 31
PIF_VALUE: 19
PIF_VALUE: 42
PIF_VALUE: 26
PIF_VALUE: 30
PIF_VALUE: 29
PIF_VALUE: 24
PIF_VALUE: 29
PIF_VALUE: 11
PIF_VALUE: 26
PIF_VALUE: 17
PIF_VALUE: 37
PIF_VALUE: 37
PIF_VALUE: 28
PIF_VALUE: 30
PIF_VALUE: 11
PIF_VALUE: 26
PIF_VALUE: 30
PIF_VALUE: 30
PIF_VALUE: 33
PIF_VALUE: 35
PIF_VALUE: 24
PIF_VALUE: 35
PIF_VALUE: 28
PIF_VALUE: 28
PIF_VALUE: 24
PIF_VALUE: 11
PIF_VALUE: 29
PIF_VALUE: 35
PIF_VALUE: 25
PIF_VALUE: 29
PIF_VALUE: 26
PIF_VALUE: 34
PIF_VALUE: 35
PIF_VALUE: 30
PIF_VALUE: 29
PIF_VALUE: 41
PIF_VALUE: 11
PIF_VALUE: 11
PIF_VALUE: 36
PIF_VALUE: 38
PIF_VALUE: 33
PIF_VALUE: 41
PIF_VALUE: 25
PIF_VALUE: 18
PIF_VALUE: 11
PIF_VALUE: 24
PIF_VALUE: 37
PIF_VALUE: 27
PIF_VALUE: 39
PIF_VALUE: 35
PIF_VALUE: 37
PIF_VALUE: 32
PIF_VALUE: 22
PIF_VALUE: 21
PIF_VALUE: 45
PIF_VALUE: 33
PIF_VALUE: 18
PIF_VALUE: 11
PIF_VALUE: 45
PIF_VALUE: 32
PIF_VALUE: 34
PIF_VALUE: 29
PIF_VALUE: 10
PIF_VALUE: 24
PIF_VALUE: 27
PIF_VALUE: 35
PIF_VALUE: 22
PIF_VALUE: 43
PIF_VALUE: 38
PIF_VALUE: 33
PIF_VALUE: 16
PIF_VALUE: 11
PIF_VALUE: 41
PIF_VALUE: 11
PIF_VALUE: 23
PIF_VALUE: 31
PIF_VALUE: 34
PIF_VALUE: 38
PIF_VALUE: 11
PIF_VALUE: 32
PIF_VALUE: 37
PIF_VALUE: 36
PIF_VALUE: 29
PIF_VALUE: 17
PIF_VALUE: 37
PIF_VALUE: 37
PIF_VALUE: 36
PIF_VALUE: 26
PIF_VALUE: 11
PIF_VALUE: 37
PIF_VALUE: 37
PIF_VALUE: 26
PIF_VALUE: 41
PIF_VALUE: 41
PIF_VALUE: 37
PIF_VALUE: 26
PIF_VALUE: 38
PIF_VALUE: 11
PIF_VALUE: 29
PIF_VALUE: 34
PIF_VALUE: 25
PIF_VALUE: 33
PIF_VALUE: 34
PIF_VALUE: 17
PIF_VALUE: 28
PIF_VALUE: 40
PIF_VALUE: 33
PIF_VALUE: 28
PIF_VALUE: 26
PIF_VALUE: 32
PIF_VALUE: 23
PIF_VALUE: 38
PIF_VALUE: 45
PIF_VALUE: 25
PIF_VALUE: 22
PIF_VALUE: 34
PIF_VALUE: 31
PIF_VALUE: 29
PIF_VALUE: 11
PIF_VALUE: 32
PIF_VALUE: 18
PIF_VALUE: 35
PIF_VALUE: 30
PIF_VALUE: 39
PIF_VALUE: 35
PIF_VALUE: 26
PIF_VALUE: 11
PIF_VALUE: 31
PIF_VALUE: 27
PIF_VALUE: 34
PIF_VALUE: 40
PIF_VALUE: 37
PIF_VALUE: 28
PIF_VALUE: 27
PIF_VALUE: 29
PIF_VALUE: 28
PIF_VALUE: 18
PIF_VALUE: 39
PIF_VALUE: 35
PIF_VALUE: 36
PIF_VALUE: 11
PIF_VALUE: 33
PIF_VALUE: 23
PIF_VALUE: 39
PIF_VALUE: 27
PIF_VALUE: 27
PIF_VALUE: 23
PIF_VALUE: 22
PIF_VALUE: 30
PIF_VALUE: 24
PIF_VALUE: 34
PIF_VALUE: 26
PIF_VALUE: 30
PIF_VALUE: 28
PIF_VALUE: 25
PIF_VALUE: 34
PIF_VALUE: 34
PIF_VALUE: 32
PIF_VALUE: 23
PIF_VALUE: 35
PIF_VALUE: 37
PIF_VALUE: 34
PIF_VALUE: 35
PIF_VALUE: 22
PIF_VALUE: 33
PIF_VALUE: 32
PIF_VALUE: 28
PIF_VALUE: 33
PIF_VALUE: 34
PIF_VALUE: 31
PIF_VALUE: 37
PIF_VALUE: 11
PIF_VALUE: 11
PIF_VALUE: 30
PIF_VALUE: 23
PIF_VALUE: 38
PIF_VALUE: 11
PIF_VALUE: 33
PIF_VALUE: 35
PIF_VALUE: 36
PIF_VALUE: 27
PIF_VALUE: 20
PIF_VALUE: 33
PIF_VALUE: 25
PIF_VALUE: 23
PIF_VALUE: 25
PIF_VALUE: 33
PIF_VALUE: 37
PIF_VALUE: 44
PIF_VALUE: 27
PIF_VALUE: 25
PIF_VALUE: 11
PIF_VALUE: 14
PIF_VALUE: 11
PIF_VALUE: 28
PIF_VALUE: 33
PIF_VALUE: 38
PIF_VALUE: 11
PIF_VALUE: 35
PIF_VALUE: 24
PIF_VALUE: 35
PIF_VALUE: 31
PIF_VALUE: 25
PIF_VALUE: 16
PIF_VALUE: 32
PIF_VALUE: 33
PIF_VALUE: 30
PIF_VALUE: 30
PIF_VALUE: 24
PIF_VALUE: 34
PIF_VALUE: 38
PIF_VALUE: 42
PIF_VALUE: 28
PIF_VALUE: 31
PIF_VALUE: 27
PIF_VALUE: 35

## 2019-01-01 ASSESSMENT — PAIN SCALES - GENERAL
PAINLEVEL_OUTOF10: 5
PAINLEVEL_OUTOF10: 0
PAINLEVEL_OUTOF10: 8
PAINLEVEL_OUTOF10: 8
PAINLEVEL_OUTOF10: 4
PAINLEVEL_OUTOF10: 8
PAINLEVEL_OUTOF10: 9
PAINLEVEL_OUTOF10: 9
PAINLEVEL_OUTOF10: 8
PAINLEVEL_OUTOF10: 4
PAINLEVEL_OUTOF10: 8
PAINLEVEL_OUTOF10: 8
PAINLEVEL_OUTOF10: 0
PAINLEVEL_OUTOF10: 0
PAINLEVEL_OUTOF10: 8
PAINLEVEL_OUTOF10: 5
PAINLEVEL_OUTOF10: 9
PAINLEVEL_OUTOF10: 4
PAINLEVEL_OUTOF10: 10
PAINLEVEL_OUTOF10: 8
PAINLEVEL_OUTOF10: 8
PAINLEVEL_OUTOF10: 7
PAINLEVEL_OUTOF10: 7
PAINLEVEL_OUTOF10: 10
PAINLEVEL_OUTOF10: 0
PAINLEVEL_OUTOF10: 8
PAINLEVEL_OUTOF10: 10
PAINLEVEL_OUTOF10: 7
PAINLEVEL_OUTOF10: 10
PAINLEVEL_OUTOF10: 7
PAINLEVEL_OUTOF10: 0
PAINLEVEL_OUTOF10: 8
PAINLEVEL_OUTOF10: 7
PAINLEVEL_OUTOF10: 0
PAINLEVEL_OUTOF10: 0
PAINLEVEL_OUTOF10: 10
PAINLEVEL_OUTOF10: 9
PAINLEVEL_OUTOF10: 7
PAINLEVEL_OUTOF10: 9
PAINLEVEL_OUTOF10: 6
PAINLEVEL_OUTOF10: 6
PAINLEVEL_OUTOF10: 10
PAINLEVEL_OUTOF10: 0
PAINLEVEL_OUTOF10: 0
PAINLEVEL_OUTOF10: 4
PAINLEVEL_OUTOF10: 10
PAINLEVEL_OUTOF10: 10
PAINLEVEL_OUTOF10: 0
PAINLEVEL_OUTOF10: 0
PAINLEVEL_OUTOF10: 4
PAINLEVEL_OUTOF10: 10
PAINLEVEL_OUTOF10: 8
PAINLEVEL_OUTOF10: 5
PAINLEVEL_OUTOF10: 0
PAINLEVEL_OUTOF10: 9
PAINLEVEL_OUTOF10: 0
PAINLEVEL_OUTOF10: 9
PAINLEVEL_OUTOF10: 10
PAINLEVEL_OUTOF10: 5
PAINLEVEL_OUTOF10: 7
PAINLEVEL_OUTOF10: 8
PAINLEVEL_OUTOF10: 0
PAINLEVEL_OUTOF10: 6
PAINLEVEL_OUTOF10: 0
PAINLEVEL_OUTOF10: 9
PAINLEVEL_OUTOF10: 8
PAINLEVEL_OUTOF10: 5
PAINLEVEL_OUTOF10: 5
PAINLEVEL_OUTOF10: 10
PAINLEVEL_OUTOF10: 6
PAINLEVEL_OUTOF10: 8
PAINLEVEL_OUTOF10: 10
PAINLEVEL_OUTOF10: 8
PAINLEVEL_OUTOF10: 3

## 2019-01-01 ASSESSMENT — PAIN DESCRIPTION - ONSET
ONSET: GRADUAL
ONSET: ON-GOING
ONSET: GRADUAL
ONSET: ON-GOING

## 2019-01-01 ASSESSMENT — PAIN SCALES - WONG BAKER
WONGBAKER_NUMERICALRESPONSE: 4
WONGBAKER_NUMERICALRESPONSE: 0

## 2019-01-01 ASSESSMENT — PAIN DESCRIPTION - PROGRESSION
CLINICAL_PROGRESSION: GRADUALLY WORSENING
CLINICAL_PROGRESSION: NOT CHANGED
CLINICAL_PROGRESSION: GRADUALLY IMPROVING
CLINICAL_PROGRESSION: GRADUALLY WORSENING
CLINICAL_PROGRESSION: GRADUALLY WORSENING

## 2019-01-01 ASSESSMENT — PAIN DESCRIPTION - PAIN TYPE
TYPE: CHRONIC PAIN
TYPE: ACUTE PAIN;CHRONIC PAIN
TYPE: CHRONIC PAIN

## 2019-01-01 ASSESSMENT — PAIN DESCRIPTION - DESCRIPTORS
DESCRIPTORS: ACHING;DISCOMFORT
DESCRIPTORS: ACHING
DESCRIPTORS: ACHING;DISCOMFORT
DESCRIPTORS: ACHING
DESCRIPTORS: ACHING;DISCOMFORT

## 2019-01-01 ASSESSMENT — PAIN DESCRIPTION - ORIENTATION
ORIENTATION: MID
ORIENTATION: LOWER

## 2019-01-01 ASSESSMENT — PAIN DESCRIPTION - LOCATION
LOCATION: GENERALIZED
LOCATION: GENERALIZED
LOCATION: ABDOMEN;GENERALIZED
LOCATION: LEG
LOCATION: ABDOMEN;BACK;LEG
LOCATION: GENERALIZED
LOCATION: GENERALIZED

## 2019-01-01 ASSESSMENT — PAIN DESCRIPTION - FREQUENCY
FREQUENCY: INTERMITTENT

## 2019-06-03 PROBLEM — R57.9 SHOCK (HCC): Status: ACTIVE | Noted: 2019-01-01

## 2019-06-03 PROBLEM — R65.21 SEPTIC SHOCK (HCC): Status: ACTIVE | Noted: 2019-01-01

## 2019-06-03 PROBLEM — A41.9 SEPTIC SHOCK (HCC): Status: ACTIVE | Noted: 2019-01-01

## 2019-06-03 PROBLEM — R79.1 SUPRATHERAPEUTIC INR: Status: ACTIVE | Noted: 2019-01-01

## 2019-06-03 NOTE — CONSULTS
Thank you to requesting provider: Dr. Meghan Pruitt, for asking us to see Wesley Chambers  Reason for consultation:   ESRD  Chief Complaint:  Unresponsive     History of Presenting Illness      60 y/o male with history of ESRD now living at [facility]. He is admitted to the ICU with septic shock. He has chronic respiratory failure and he has a trach. He was sent to the ER following reported episode of emesis and found to have severely hypotensive. He is now on multiple pressors and in critical condition in the ICU. We have been asked to see him for renal failure. He has a right TDC. Labs reviewed. Daughter at the bedside       Past Medical/Surgical History      Active Ambulatory Problems     Diagnosis Date Noted    Chronic atrial fibrillation ([address]     Morbid obesity with BMI of 60.0-69.9, adult ([address] 01/09/2014    COPD (chronic obstructive pulmonary disease) ([address] 01/09/2014    DMII (diabetes mellitus, type 2) ([address] 03/18/2017    SOB (shortness of breath) 03/18/2017    ELODIA (obstructive sleep apnea) 03/18/2017    COPD exacerbation ([address] 03/19/2017    ELODIA treated with BiPAP 03/19/2017    NSTEMI (non-ST elevated myocardial infarction) ([address]     Essential hypertension     Chronic diastolic heart failure ([address]     Coronary artery disease involving native coronary artery of native heart without angina pectoris 06/02/2017     Resolved Ambulatory Problems     Diagnosis Date Noted    Elevated troponin 03/18/2017    Precordial pain 03/19/2017     Past Medical History:   Diagnosis Date    Atrial fibrillation (HCC)     CAD (coronary artery disease)     Diastolic heart failure (HCC)     Obesity     Type II or unspecified type diabetes mellitus without mention of complication, not stated as uncontrolled          Review of Systems     Not able to obtain       Medications      Reviewed in EMR     Allergies     Patient has no known allergies.       Family History       Negative for Kidney Disease    Social History Social History     Socioeconomic History    Marital status:      Spouse name: None    Number of children: None    Years of education: None    Highest education level: None   Occupational History    None   Social Needs    Financial resource strain: None    Food insecurity:     Worry: None     Inability: None    Transportation needs:     Medical: None     Non-medical: None   Tobacco Use    Smoking status: Former Smoker     Packs/day: 1.00     Years: 25.00     Pack years: 25.00     Last attempt to quit: 2013     Years since quittin.4    Smokeless tobacco: Never Used   Substance and Sexual Activity    Alcohol use: Yes     Comment: couple x year maybe    Drug use: No    Sexual activity: None   Lifestyle    Physical activity:     Days per week: None     Minutes per session: None    Stress: None   Relationships    Social connections:     Talks on phone: None     Gets together: None     Attends [employer] service: None     Active member of club or organization: None     Attends meetings of clubs or organizations: None     Relationship status: None    Intimate partner violence:     Fear of current or ex partner: None     Emotionally abused: None     Physically abused: None     Forced sexual activity: None   Other Topics Concern    None   Social History Narrative    None       Physical Exam     Blood pressure 94/74, pulse 101, temperature 99.5 °F (37.5 °C), temperature source Oral, resp. rate 19, height 5' 9\" (1.753 m), weight (!) 420 lb (190.5 kg), SpO2 96 %.     General:  Critically ill   HEENT:  PERRL, normal nose  Neck:  Supple, trach   Chest:  Respiratory failure, on vent, FiO2 of 65%  CV:  Irregular, tachycardic   Abdomen:  NTND, soft, +BS, no hepatosplenomegaly  Extremities:  No peripheral edema  Neurological:  Moving all four extremities, CN II-XII grossly intact  Lymphatics:  No palpable lymph nodes  Skin:  No rash, no jaundice  Psychiatric:  Not responsive  Access:  Right TDC

## 2019-06-03 NOTE — PROGRESS NOTES
Evening assessment completed. See flowsheet. Vent trach. pt alert, unable to speak, but following some direction. Lungs sounds diminished throughout. Levophed infusing at 30 mcg/min, vasopressin infusing at 0.04 units/hr. Afib on bedside monitor. Bowel sounds active. +1 BUE and BLE edema noted. Anuric. Labs reviewed. Cont to monitor.

## 2019-06-03 NOTE — PROGRESS NOTES
Dr. Norlene Phalen bedside. Spoke with family regarding code status. Family decided to keep pt full code and continue with care at this time.

## 2019-06-03 NOTE — PROGRESS NOTES
Pt's family requested no full skin assessment at this time. Reportedly from family pt has a pressure ulcer on sacrum. Pt has scattered scabs on BLE. . Large healing scab to LLE.

## 2019-06-03 NOTE — ED PROVIDER NOTES
[facility] ICU  eMERGENCY dEPARTMENT eNCOUnter      Pt Name: Jono Pete  MRN: [id number]  Jonesgfarpita 1960  Date of evaluation: 6/3/2019  Provider: Williams Elena MD  PCP: Mariela Anne MD      [address] [place] [address]       Chief Complaint   Patient presents with    Emesis     Pt brought over from [facility] for an episode of emesis. Pt reportedly had an episode of LOC with the emesis. Pt is a vented patient and was bagged upon arrival.        HISTORY OFPRESENT ILLNESS   (Location/Symptom, Timing/Onset, Context/Setting, Quality, Duration, Modifying Factors,Severity)  Note limiting factors. Jono Pete is a 61 y.o. male  Brought over from the [facility] for eval  That he was not on his ventilator last night he is a dialysis patient who is due for yang and there is report that he was short of breath  And that he may have vomited and that they're concerned about aspiration he is not able to communicate with us he but because of his trach is not able To verbalize. History is obtained from the nursing     Nursing Notes were all reviewed and agreed with or any disagreements were addressed  in the HPI. REVIEW OF SYSTEMS    (2-9 systems for level 4, 10 or more for level 5)     Review of Systems    Positives and Pertinent negatives as per HPI. Except as noted above in the ROS, all other systems were reviewed andnegative.        PASTMEDICAL HISTORY     Past Medical History:   Diagnosis Date    Atrial fibrillation ([address]     CAD (coronary artery disease)     Diastolic heart failure (HCC)     Obesity     Type II or unspecified type diabetes mellitus without mention of complication, not stated as uncontrolled          SURGICAL HISTORY       Past Surgical History:   Procedure Laterality Date    CORONARY ANGIOPLASTY WITH STENT PLACEMENT      stents x 2     KNEE SURGERY           CURRENT MEDICATIONS       Current Discharge Medication List      CONTINUE these medications which have NOT CHANGED    Details   acetaminophen (SINGULAIR) 10 MG tablet Take 10 mg by mouth nightly      ondansetron (ZOFRAN) 4 MG tablet Take 4 mg by mouth every 6 hours as needed for Nausea or Vomiting      oxyCODONE 5 MG capsule Take 10 mg by mouth every 4 hours as needed for Pain.      scopolamine (TRANSDERM-SCOP) transdermal patch Place 1 patch onto the skin every 72 hours      senna (SENOKOT) 8.6 MG tablet Take 1 tablet by mouth 2 times daily      sevelamer (RENVELA) 800 MG tablet Take 1 tablet by mouth 3 times daily (with meals)      sodium chloride, Inhalant, 3 % nebulizer solution Take 4 mLs by nebulization every 6 hours as needed for Other      traZODone (DESYREL) 100 MG tablet Take 100 mg by mouth nightly      warfarin (COUMADIN) 3 MG tablet Take 3 mg by mouth daily      ipratropium-albuterol (DUONEB) 0.5-2.5 (3) MG/3ML SOLN nebulizer solution Inhale 1 vial into the lungs every 4 hours      !! gabapentin (NEURONTIN) 300 MG capsule Take 100 mg by mouth nightly as needed. atorvastatin (LIPITOR) 40 MG tablet Take 1 tablet by mouth daily  Qty: 90 tablet, Refills: 3      albuterol (PROVENTIL HFA;VENTOLIN HFA) 108 (90 BASE) MCG/ACT inhaler Inhale 2 puffs into the lungs every 4 hours as needed for Wheezing. allopurinol (ZYLOPRIM) 300 MG tablet Take 100 mg by mouth daily        ! ! - Potential duplicate medications found. Please discuss with provider. ALLERGIES     Patient has no known allergies.     FAMILY HISTORY       Family History   Problem Relation Age of Onset    Heart Disease Mother         stent          SOCIAL HISTORY       Social History     Socioeconomic History    Marital status:      Spouse name: None    Number of children: None    Years of education: None    Highest education level: None   Occupational History    None   Social Needs    Financial resource strain: None    Food insecurity:     Worry: None     Inability: None    Transportation needs:     Medical: None     Non-medical: None   Tobacco Use    Smoking Laboratory  [facility],  [place], [employer]   Phone [phone]   LACTIC ACID, PLASMA - Abnormal; Notable for the following components:    Lactic Acid 4.0 (*)     All other components within normal limits    Narrative:     Drew Orta tel. [phone],  Chemistry results called to and read back by Farzana Hancock RN, 06/03/2019  12:56, by Rohan Rodrigues  Performed at:  [facility],  [place], [employer]   Phone [phone]   PROTIME-INR - Abnormal; Notable for the following components:    Protime 54.3 (*)     INR 4.76 (*)     All other components within normal limits    Narrative:     Drew Orta tel. [phone],  Coag results called to and read back by Sarita Vela RN, 06/03/2019 12:47, by  [facility]  Performed at:  [place] [address],  [place], [employer]   Phone [phone]   LACTATE, SEPSIS - Abnormal; Notable for the following components:    Lactic Acid, Sepsis 2.5 (*)     All other components within normal limits    Narrative:     Performed at:  [facility] [address],  [place], [employer]   Phone [phone]   LACTATE, SEPSIS - Abnormal; Notable for the following components:    Lactic Acid, Sepsis 2.0 (*)     All other components within normal limits    Narrative:     Performed at:  [facility]) [facility],  [place], [employer]   Phone [phone]   TROPONIN - Abnormal; Notable for the following components:    Troponin 0.21 (*)     All other components within normal limits    Narrative:     Performed at:  [facility] [address],  [place], [employer]   Phone [phone]   TROPONIN - Abnormal; Notable for the following components:    Troponin 0.23 (*)     All other components within normal limits    Narrative:     Collection has been rescheduled by [facility] at 06/03/2019 21:34.  Reason: pt   was being xhanges  Performed at:  [facility] [address],  [employer]   Phone [phone]   CBC WITH AUTO DIFFERENTIAL - Abnormal; Notable for the following components:    RBC 4.09 (*)     Hemoglobin 11.0 (*)     Hematocrit 36.7 (*)     MCHC 30.0 (*)     RDW 22.0 (*)     Lymphocytes # 0.6 (*)     All other components within normal limits    Narrative:     Performed at:  [facility] [address],  [employer]   Phone [phone]   COMPREHENSIVE METABOLIC PANEL W/ REFLEX TO MG FOR LOW K - Abnormal; Notable for the following components:    Sodium 130 (*)     Chloride 92 (*)     Anion Gap 17 (*)     Glucose 185 (*)     BUN 34 (*)     CREATININE 4.5 (*)     GFR Non- 13 (*)     GFR  16 (*)     Alb 2.6 (*)     Albumin/Globulin Ratio 0.5 (*)     Total Bilirubin 2.0 (*)     Alkaline Phosphatase 249 (*)     AST 45 (*)     All other components within normal limits    Narrative:     Performed at:  [place] [address],  [employer] [facility]   Phone [phone]   PROTIME-INR - Abnormal; Notable for the following components:    Protime 20.1 (*)     INR 1.76 (*)     All other components within normal limits    Narrative:     Performed at:  [facility] [address],  [employer]   Phone [phone]   BLOOD GAS, ARTERIAL - Abnormal; Notable for the following components:    pO2, Arterial 141.8 (*)     Hemoglobin, Art, Extended 10.9 (*)     All other components within normal limits    Narrative:     Performed at:  [facility]) [facility],  [employer]   Phone [phone]   CALCIUM, IONIZED - Abnormal; Notable for the following components:    pH, Dmitriy 7.317 (*)     All other components within normal limits    Narrative:     Performed at:  [facility]) - no administration in time range)   albuterol sulfate  (90 Base) MCG/ACT inhaler 4 puff (4 puffs Inhalation Given 6/4/19 1902)   ipratropium (ATROVENT HFA) 17 MCG/ACT inhaler 4 puff (4 puffs Inhalation Given 6/4/19 1902)   vasopressin 20 Units in dextrose 5 % 100 mL infusion (0.04 Units/min Intravenous New Bag 6/4/19 1343)   hydrocortisone sodium succinate PF (SOLU-CORTEF) injection 50 mg (50 mg Intravenous Given 6/4/19 2020)   chlorhexidine (PERIDEX) 0.12 % solution 15 mL (15 mLs Mouth/Throat Given 6/4/19 2019)   fentaNYL (SUBLIMAZE) injection 25 mcg (25 mcg Intravenous Given 6/4/19 1740)   midazolam (VERSED) injection 2 mg (2 mg Intravenous Given 6/4/19 1455)   sodium chloride 0.9 % infusion (  Canceled Entry 6/3/19 1230)   sodium chloride 0.9 % infusion (  Canceled Entry 6/3/19 1241)   sodium chloride 0.9 % infusion (  Canceled Entry 6/3/19 1252)   vancomycin (VANCOCIN) intermittent dosing (placeholder) ( Other Canceled Entry 6/3/19 1628)   ipratropium-albuterol (DUONEB) nebulizer solution 1 ampule (has no administration in time range)   insulin lispro (HUMALOG) injection vial 0-6 Units (2 Units Subcutaneous Given 6/4/19 2021)   lidocaine PF 1 % injection 5 mL ( Intradermal Canceled Entry 6/4/19 0953)   mupirocin (BACTROBAN) 2 % ointment ( Nasal Given 6/4/19 2020)   phenol 1.4 % mouth spray 1 spray (1 spray Mouth/Throat Given 6/4/19 1834)   prismaSATE BGK 4/2.5 dialysis solution (2,000 mL/hr Dialysis New Bag 6/4/19 2048)   prismaSATE BGK 4/2.5 dialysis solution (1,000 mL/hr Dialysis New Bag 6/4/19 2051)   prismaSol BGK 4/2.5 dialysis solution (1,000 mL/hr Dialysis New Bag 6/4/19 2049)   potassium chloride 20 mEq/50 mL IVPB (Central Line) (has no administration in time range)   magnesium sulfate 1 g in dextrose 5% 100 mL IVPB (has no administration in time range)   calcium gluconate 1 g in dextrose 5 % 100 mL IVPB (0 g Intravenous Stopped 6/4/19 8323)     Or   calcium gluconate 2 g in dextrose 5 % 100 mL

## 2019-06-03 NOTE — CONSULTS
Reason for referral and CC: Shock, resp failure    HISTORY OF PRESENT ILLNESS: 60 yo male with a h/o CAD, COPD, ESRD on HD, chronic tracheostomy presented from [facility] reported episode of loss of consciousness and emesis. In the emergency room he was severely hypotensive with poor response to multiple pressors. He is also severely hypoxic with maximal ventilator settings. Due to severe coagulopathy an IO IV was placed instead of a CVC. He is admitted to the ICU for further management. Past Medical History:   Diagnosis Date    Atrial fibrillation ([address]     CAD (coronary artery disease)     Diastolic heart failure (HCC)     Obesity     Type II or unspecified type diabetes mellitus without mention of complication, not stated as uncontrolled      Past Surgical History:   Procedure Laterality Date    CORONARY ANGIOPLASTY WITH STENT PLACEMENT      stents x 2     KNEE SURGERY       Family History  family history includes Heart Disease in his mother. Social History:  reports that he quit smoking about 5 years ago. He has a 25.00 pack-year smoking history. He has never used smokeless tobacco.   reports that he drinks alcohol. ALLERGIES:  Patient has No Known Allergies.   Continuous Infusions:   norepinephrine 30 mcg/min (06/03/19 0810)    EPINEPHrine infusion 3 mcg/min (06/03/19 1119)    sodium chloride      vasopressin (Septic Shock) infusion       Scheduled Meds:   vancomycin  1,000 mg Intravenous Once    piperacillin-tazobactam  3.375 g Intravenous Once    sodium chloride  250 mL Intravenous Once    phytonadione (VITAMIN K)  IVPB  10 mg Intravenous Once    albuterol sulfate HFA  4 puff Inhalation Q4H    ipratropium  4 puff Inhalation Q4H    hydrocortisone sodium succinate PF  50 mg Intravenous Q6H     PRN Meds:    REVIEW OF SYSTEMS:  Pt unable to answer    PHYSICAL EXAM: BP (!) 126/107   Pulse 118   Temp 99.5 °F (37.5 °C) (Oral)   Resp 21   Ht 5' 9\" (1.753 m) Comment: Historical  Wt (!) 420 lb (190.5 kg)   SpO2 93%   BMI 62.02 kg/m²  on *100%  Constitutional:  No acute distress. Eyes: PERRL. Conjunctivae anicteric. ENT: Normal nose. Normal tongue. Trach in place  Neck:  Trachea is midline. No thyroid tenderness. Respiratory: No accessory muscle usage. Dcreased breath sounds. No wheezes. No rales. No Rhonchi. Cardiovascular: Normal S1S2. No digit clubbing. No digit cyanosis. mild LE edema. Capillary refill is sluggish  Gastrointestinal: No mass palpated. No tenderness palpated. No umbilical hernia. Lymphatic: No cervical or supraclavicular adenopathy. Skin: No rash on the exposed extremities. No Nodules or induration on exposed extremities. Psychiatric: No anxiety or Agitation. Alert to person,    CBC:   Recent Labs     06/03/19  0719   WBC 10.6   HGB 9.2*   HCT 34.6*   MCV 90.0        BMP:   Recent Labs     06/03/19  0630   *   K 3.9   CL 87*   CO2 22   BUN 26*   CREATININE 4.4*        Recent Labs     06/03/19  0630   AST 22   ALT 13   LIPASE 7.0*   BILITOT 0.8   ALKPHOS 212*     Recent Labs     06/03/19  0719   PROTIME >170.0*   INR >14.91*     No results for input(s): NITRITE, COLORU, PHUR, LABCAST, WBCUA, RBCUA, MUCUS, TRICHOMONAS, YEAST, BACTERIA, CLARITYU, SPECGRAV, LEUKOCYTESUR, UROBILINOGEN, BILIRUBINUR, BLOODU, GLUCOSEU, AMORPHOUS in the last 72 hours. Invalid input(s): Maudry Kitchen  Recent Labs     06/03/19  1100   PHART 7.329*   PVP2BFT 44.4   PO2ART 92.6       Chest imaging was reviewed by me and showed   Retrocardiac opacities favored to represent bronchovascular crowding and   atelectasis.  Difficult to entirely exclude superimposed aspiration or   pneumonia.        ASSESSMENT:  · Shock - suspect secondary to sepsis, possibly pneumonia given episode of emesis with LOC at NH  · COPD with likely exacerbation  · ELODIA/OHS  · Chronic tracheostomy  · Afib on Coumadin  · Coagulopathy  · DM2  · Chronic diastolic CHF  · ESRD on HD  · CAD with RAMANA to LAD  · Elevated troponin: possible due to renal failure and speiss - trend and check ECGs    PLAN:  Mechanical ventilation as per my orders. The ventilator was adjusted by me at the bedside for unstable, life threatening respiratory failure. IV Propofol for sedation, target RASS -2, with daily spontaneous awakening trial.  Fentanyl PRN pain, gtt as needed  Head of bed 30 degrees or higher at all times  Daily spontaneous breathing trial once PEEP less than 8, FiO2 less than 55%. IV fluid resuscitation with crystalloid  Monitor lactate  Broad spectrum antibiotics to include Vanc and Zosyn  IV vasopressors to keep MAP > 64 mmHg: Levophed and vasopressin  Wean off epi gtt  Hydrocortisone 50mg q6  FFP received in ED  10mg IV Vit K  Repeat INR  Had a normal TTE in 12/18  Follow up on cultures  Follow electrolytes  Consult nephrology for CRRT  Blood sugar control, with goal 140-180  · SCD  · PPI  · Due to at least single organ failure and risk of rapid deterioration, I spent 42 minutes of Critical care time reviewing labs/films, examining patient, collaborating with other physicians. This does not include time performing critical procedures.     Thank you Tony Robertson, * for this consult

## 2019-06-03 NOTE — ED NOTES
RN and Tata DANIELS verified patients plasma. RN will remain with patient for first 15 minutes to monitor for side effects.      Anastacio Solano RN  06/03/19 0269

## 2019-06-03 NOTE — ED PROVIDER NOTES
Substance and Sexual Activity    Alcohol use: Yes     Comment: couple x year maybe    Drug use: No    Sexual activity: None   Lifestyle    Physical activity:     Days per week: None     Minutes per session: None    Stress: None   Relationships    Social connections:     Talks on phone: None     Gets together: None     Attends Spiritism service: None     Active member of club or organization: None     Attends meetings of clubs or organizations: None     Relationship status: None    Intimate partner violence:     Fear of current or ex partner: None     Emotionally abused: None     Physically abused: None     Forced sexual activity: None   Other Topics Concern    None   Social History Narrative    None       SCREENINGS      @FLOW([id number])@      PHYSICAL EXAM    (up to 7 for level 4, 8 or more for level 5)     ED Triage Vitals   BP Temp Temp Source Pulse Resp SpO2 Height Weight   06/03/19 0520 06/03/19 0520 06/03/19 0520 06/03/19 0520 06/03/19 0520 06/03/19 0520 06/03/19 0845 06/03/19 0520   (!) 66/37 97.4 °F (36.3 °C) Axillary 124 19 93 % 5' 9\" (1.753 m) (!) 420 lb (190.5 kg)       Physical Exam   Constitutional: He appears well-developed and well-nourished. No distress. Morbid obesity, nonverbal,   HENT:   Head: Normocephalic and atraumatic. Eyes: EOM are normal. No scleral icterus. Neck: Normal range of motion. No tracheal deviation present. Trach in place   Pulmonary/Chest: Effort normal. No respiratory distress. Abdominal: Soft. He exhibits no distension. Musculoskeletal: He exhibits no edema or deformity. Neurological: He is alert. Skin: Skin is warm and dry. Nursing note and vitals reviewed.       DIAGNOSTIC RESULTS   LABS:    Labs Reviewed   COMPREHENSIVE METABOLIC PANEL W/ REFLEX TO MG FOR LOW K - Abnormal; Notable for the following components:       Result Value    Sodium 130 (*)     Chloride 87 (*)     Anion Gap 21 (*)     Glucose 131 (*)     BUN 26 (*)     CREATININE 4.4 (*) CARE TIME   Critical care 75 min critical care time, not including separately billable procedures due to imminent cardiovascular collapse    CONSULTS:  IP CONSULT TO CRITICAL CARE  IP CONSULT TO [address] [place] [address] and DIFFERENTIAL DIAGNOSIS/MDM:   Vitals:    Vitals:    06/03/19 3075 06/03/19 0845 06/03/19 0859 06/03/19 0914   BP: 92/76  98/82 (!) 72/54   Pulse: 126  131 122   Resp: 20  28 24   Temp:    99 °F (37.2 °C)   TempSrc:    Oral   SpO2:   96%    Weight:       Height:  5' 9\" (1.753 m)         Patient was given the following medications:  Medications   vancomycin 1000 mg IVPB in 250 mL D5W addavial (has no administration in time range)   piperacillin-tazobactam (ZOSYN) 3.375 g in sodium chloride 0.9 % 100 mL IVPB (mini-bag) (has no administration in time range)   norepinephrine (LEVOPHED) 16 mg in dextrose 5 % 250 mL infusion (30 mcg/min Intravenous Rate/Dose Change 6/3/19 0810)   EPINEPHrine (EPINEPHrine HCL) 5 mg in dextrose 5 % 250 mL infusion (8 mcg/min Intravenous Rate/Dose Change 6/3/19 0831)   0.9 % sodium chloride bolus (has no administration in time range)   sodium chloride 0.9 % infusion (has no administration in time range)   0.9 % sodium chloride bolus (1,000 mLs Intravenous New Bag 6/3/19 0721)   hydrocortisone sodium succinate PF (SOLU-CORTEF) injection 100 mg (100 mg Intravenous Given 6/3/19 6002)       Patient presents to ED with ultimate mental status G vomiting and hypotension patient is very difficult vascular access on due to body habitus patient did finally get vascular access fluid resuscitation with PCP. Check for leukocytosis electrode abnormality troponin for any cardiac ischemia cause any coagulopathy lactic for any lactic acidosis x-ray for any infiltrate we'll reassess.   Workup notable for supratherapeutic INR past the point measurement, possible infiltrate on chest x-ray for any lactic acidosis the patient required IO access and started on pressors to maintain blood pressure after fluid resuscitation Levothroid, epinephrine drip, FFP, and cortisol shot patient improved mentation and returned to baseline arousable and can indication ability. Discussed with ICU who accepted patient discussed with hospitalist patient be admitted hospital for further management    The patient tolerated their visit well. Thepatient and / or the family were informed of the results of any tests, a time was given to answer questions. FINAL IMPRESSION      1. Septic shock ([address]    2. Lactic acidosis        DISPOSITION/PLAN   DISPOSITION Decision To Admit 06/03/2019 08:30:06 AM      PATIENT REFERRED TO:  No follow-up provider specified. DISCHARGE MEDICATIONS:  New Prescriptions    No medications on file       DISCONTINUED MEDICATIONS:  Discontinued Medications    ASPIRIN 81 MG EC TABLET    Take 1 tablet by mouth daily    B COMPLEX VITAMINS CAPSULE    Take 1 capsule by mouth daily    BUDESONIDE-FORMOTEROL (SYMBICORT) 160-4.5 MCG/ACT AERO    Inhale 2 puffs into the lungs 2 times daily    DILTIAZEM (DILACOR XR) 240 MG EXTENDED RELEASE CAPSULE    Take 240 mg by mouth daily    METFORMIN (GLUCOPHAGE) 500 MG TABLET    Take 500 mg by mouth daily (with breakfast).     RIVAROXABAN (XARELTO) 20 MG TABS TABLET    Take 20 mg by mouth daily    TORSEMIDE (DEMADEX) 100 MG TABLET    Take 100 mg by mouth daily              (Please note that portions of this note were completed with a voice recognition program.  Efforts were made to edit the dictations but occasionally words aremis-transcribed.)    Marilyn Cardona DO (electronically signed)            Marilyn Cardona DO  06/03/19 5849

## 2019-06-03 NOTE — PROGRESS NOTES
Pharmacy Note  Vancomycin Consult    Hellen Lockhart is a 61 y.o. male started on Vancomycin for PNA; consult received from Dr. Rosibel Ramos to manage therapy. Also receiving the following antibiotics:Zosyn . Patient Active Problem List   Diagnosis    Chronic atrial fibrillation (HCC)    Morbid obesity with BMI of 60.0-69.9, adult (HCC)    COPD (chronic obstructive pulmonary disease) ([facility])    DMII (diabetes mellitus, type 2) ([facility])    SOB (shortness of breath)    ELODIA (obstructive sleep apnea)    COPD exacerbation ([facility])    ELODIA treated with BiPAP    NSTEMI (non-ST elevated myocardial infarction) ([facility]    Essential hypertension    Chronic diastolic heart failure ([facility]    Coronary artery disease involving native coronary artery of native heart without angina pectoris    Septic shock ([facility])    Supratherapeutic INR       Allergies:  Patient has no known allergies. Temp max:     Recent Labs     06/03/19  0630   BUN 26*       Recent Labs     06/03/19  0630   CREATININE 4.4*       Recent Labs     06/03/19  0719   WBC 10.6       No intake or output data in the 24 hours ending 06/03/19 1445    Culture Date      Source                       Results  NGTD  Ht Readings from Last 1 Encounters:   06/03/19 5' 9\" (1.753 m)        Wt Readings from Last 1 Encounters:   06/03/19 (!) 420 lb (190.5 kg)         Body mass index is 62.02 kg/m². Estimated Creatinine Clearance: 30 mL/min (A) (based on SCr of 4.4 mg/dL (H)). Goal Trough Level: mcg/mL    Assessment/Plan: Vanc 1gm given in ED, will order additional 500mg now  Vanc level ordered prior to HD, will follow sliding scale   If pre-HD level is > 20 mcg/mL, hold x1 dose Vancomycin  If pre-HD level is 10-20 mcg/mL, give 750mg Vancomycin after HD  If pre-HD level is < 10 mcg/mL, give 1500mg Vancomycin after HD     Thank you for the consult. Will continue to follow.   Deyvi Garay Pharm D 2/4/32340:21 PM  .

## 2019-06-03 NOTE — PROGRESS NOTES
FiO2 decreased to 65%         06/03/19 1440   Vent Information   Vt Ordered 500 mL   Rate Set 18 bmp   Peak Flow 80 L/min   Pressure Support 0 cmH20   FiO2  65 %   Sensitivity 3   PEEP/CPAP 12   Humidification Source Heated wire   Humidification Temp 36.6   Vent Patient Data   Peak Inspiratory Pressure 41 cmH2O   Mean Airway Pressure 18 cmH20   Rate Measured 21 br/min   Vt Exhaled 367 mL   Minute Volume 10.6 Liters   I:E Ratio 1:2.70   Cough/Sputum   Sputum How Obtained Tracheal;Suctioned   Cough Productive   Sputum Amount Moderate   Sputum Color White   Tenacity Thick   Spontaneous Breathing Trial (SBT) RT Doc   Pulse 101   Breath Sounds   Right Upper Lobe Rhonchi   Right Middle Lobe Diminished   Right Lower Lobe Diminished   Left Upper Lobe Rhonchi   Left Lower Lobe Diminished   Additional Respiratory  Assessments   Resp 19   Position Semi-Gtz's   Alarm Settings   High Pressure Alarm 45 cmH2O   Low Minute Volume Alarm 3 L/min   High Respiratory Rate 45 br/min

## 2019-06-03 NOTE — H&P
Equivocal for small   effusions. XR CHEST PORTABLE    (Results Pending)        ASSESSMENT:    Principal Problem:    Septic shock ([address]  Active Problems:    Coronary artery disease involving native coronary artery of native heart without angina pectoris    Chronic atrial fibrillation (HCC)    Morbid obesity with BMI of 60.0-69.9, adult (HCC)    COPD (chronic obstructive pulmonary disease) ([facility])    DMII (diabetes mellitus, type 2) ([facility])    ELODIA treated with BiPAP    Supratherapeutic INR  Resolved Problems:    * No resolved hospital problems. *      PLAN:    #  Septic shock. Presentation consistent with septic shock. His BP is low. He is on Levophed and Vasopressin. He was fluid resuscitated. His BP is still low. #  Acute respiratory failure. Placed on the ventilator. #  Aspiration pneumonia. Placed on Vancomycin and Zosyn. He may also have line sepsis. #  Lactic acidosis due to sepsis. Improving. #  A fib with RVR due to above. BP too low at present for rate control. Should improve. #  Stress dose of steroids- Hydrocortisone. #  ESRD:  Nephrology consult. CRRT in am    #  Coumadin toxicity corrected with FFP. #  Morbid Obesity  - Body mass index is 54.34 kg/m². - Complicating assessment and treatment. Placing patient at risk for multiple co-morbidities as well as early death and contributing to the patient's presentation. #  DM type 2 monitor sugars. I spent over 20 mts in family meeting. Plan will be to pursue aggressive treatment to see if he responds. 40 minutes of CCT spent in evaluation, documentation, reviewing of data and discussing with consultants. All questions and concerns were addressed to the patient/family. Alternatives to my treatment were discussed. The note was completed using EMR. Every effort was made to ensure accuracy; however, inadvertent computerized transcription errors may be present.                   Simona Akins 6/3/2019 7:08 PM

## 2019-06-03 NOTE — PROGRESS NOTES
2    Sputum  Sputum Color: White, Tenacity: Thick, Sputum How Obtained: Tracheal, Suctioned  Cough: Weak, non-productive = 3    Vital Signs   BP (!) 78/54   Pulse 98   Temp 99.3 °F (37.4 °C) (Axillary)   Resp 22   Ht 5' 9\" (1.753 m) Comment: Historical  Wt (!) 420 lb (190.5 kg)   SpO2 97%   BMI 62.02 kg/m²   SPO2 (COPD values may differ): Less than 86% on room air or greater than 92% on FiO2 greater than 50% = 4    Peak Flow (asthma only): not applicable = 0    RSI: Greater than 17 = Contact physician        Plan       Goals: medixcation delivery     Patient/caregiver was educated on the proper method of use for Respiratory Care Devices:  No: lethargic      Level of patient/caregiver understanding able to:   ? Verbalize understanding   ? Demonstrate understanding       ? Teach back        ? Needs reinforcement       ? No available caregiver               ? Other:     Response to education:  NA     Is patient being placed on Home Treatment Regimen? Yes     Does the patient have everything they need prior to discharge? Yes     Comments: chart reviewed, pt assessed    Plan of Care: albuterol q4, atrovent q4    Electronically signed by Gilda Holden RCP on 6/3/2019 at 4:01 PM    Respiratory Protocol Guidelines     1. Assessment and treatment by Respiratory Therapy will be initiated for medication and therapeutic interventions upon initiation of aerosolized medication. 2. Physician will be contacted for respiratory rate (RR) greater than 35 breaths per minute. Therapy will be held for heart rate (HR) greater than 140 beats per minute, pending direction from physician. 3. Bronchodilators will be administered via Metered Dose Inhaler (MDI) with spacer when the following criteria are met:  a. Alert and cooperative     b. HR < 140 bpm  c. RR < 30 bpm                d. Can demonstrate a 2-3 second inspiratory hold  4.  Bronchodilators will be administered via Hand Held Nebulizer [facility]) to patients when ANY of the following criteria are met  a. Incognizant or uncooperative          b. Patients treated with HHN at Home        c. Unable to demonstrate proper use of MDI with spacer     d. RR > 30 bpm   5. Bronchodilators will be delivered via Metered Dose Inhaler (MDI), HHN, Aerogen to intubated patients on mechanical ventilation. 6. Inhalation medication orders will be delivered and/or substituted as outlined below. Aerosolized Medications Ordering and Administration Guidelines:    1. All Medications will be ordered by a physician, and their frequency and/or modality will be adjusted as defined by the patients Respiratory Severity Index (RSI) score. 2. If the patient does not have documented COPD, consider discontinuing anticholinergics when RSI is less than 9.  3. If the bronchospasm worsens (increased RSI), then the bronchodilator frequency can be increased to a maximum of every 4 hours. If greater than every 4 hours is required, the physician will be contacted. 4. If the bronchospasm improves, the frequency of the bronchodilator can be decreased, based on the patient's RSI, but not less than home treatment regimen frequency. 5. Bronchodilator(s) will be discontinued if patient has a RSI less than 9 and has received no scheduled or as needed treatment for 72  Hrs. Patients Ordered on a Mucolytic Agent:    1. Must always be administered with a bronchodilator. 2. Discontinue if patient experiences worsened bronchospasm, or secretions have lessened to the point that the patient is able to clear them with a cough. Anti-inflammatory and Combination Medications:    1. If the patient lacks prior history of lung disease, is not using inhaled anti-inflammatory medication at home, and lacks wheezing by examination or by history for at least 24 hours, contact physician for possible discontinuation.

## 2019-06-04 NOTE — PROGRESS NOTES
Internal Medicine ICU Progress Note      Events of Last 24 hours:     He is improved. BP is better. Mentation improved. Awake and alert. Invasive Lines: CVC being placed on 19        MV:  On ventilator. Has tracheostomy    Recent Labs     19  1716 19  0505   PHART 7.418 7.383   [id number] 30.8* 39.0   PO2ART 190.9* 141.8*       MV Settings:  Vent Mode: PS Rate Set: 18 bmp/Vt Ordered: 500 mL/ /FiO2 : 40 %    IV:   prismaSATE BGK 4/2.5      prismaSATE BGK 4/2.5      prismaSol BGK 4/2.5      sodium chloride      sodium chloride      norepinephrine 16 mcg/min (19 1415)    dextrose      vasopressin (Septic Shock) infusion 0.04 Units/min (19 1343)       Vitals:  Temp  Av.9 °F (37.2 °C)  Min: 98.6 °F (37 °C)  Max: 99.4 °F (37.4 °C)  Pulse  Av  Min: 80  Max: 105  BP  Min: 70/54  Max: 117/70  SpO2  Av.1 %  Min: 97 %  Max: 100 %  FiO2   Av %  Min: 40 %  Max: 65 %  Patient Vitals for the past 4 hrs:   BP Temp Temp src Pulse Resp SpO2 Height Weight   19 1400 (!) 72/59 -- -- 100 15 98 % -- --   19 1300 94/66 -- -- 98 18 98 % -- --   19 1257 -- -- -- -- -- -- -- (!) 378 lb 6.4 oz (171.6 kg)   19 1200 (!) 94/55 -- -- 91 18 99 % -- --   19 1154 -- -- -- -- -- -- 5' 9\" (1.753 m) --   19 1114 -- -- -- 90 20 98 % -- --   19 1100 101/79 98.9 °F (37.2 °C) Oral 102 15 97 % -- --   19 1034 87/63 -- -- 104 16 98 % -- --   19 1030 (!) 70/54 -- -- 101 17 97 % -- --       CVP:        Intake/Output Summary (Last 24 hours) at 2019 1428  Last data filed at 2019 1325  Gross per 24 hour   Intake 3600 ml   Output 0 ml   Net 3600 ml       EXAM:  General: No distress. Alert. Looks older than stated age  Eyes: PERRL. No sclera icterus. No conjunctiva injected. ENT: No discharge. Pharynx clear. Neck: Trachea midline. Normal thyroid. Resp: No accessory muscle use. No crackles. No wheezing. No rhonchi. No dullness on percussion.

## 2019-06-04 NOTE — PROGRESS NOTES
Dr Prado aware of BP and vasopressin IV drip resumption. New order for chloraseptic spray for c/o sore throat. Family in at bedside, status updated. Call light in reach, paper and marker provided to patient as communication aid.

## 2019-06-04 NOTE — PLAN OF CARE
Nutrition Problem: Inadequate oral intake  Intervention: Food and/or Nutrient Delivery: Continue NPO  Nutritional Goals: patient will adhere to NPO status until he is medically cleared to receive nutrition therapy

## 2019-06-04 NOTE — PROGRESS NOTES
Fentanyl 25 mg IV prior to IO R Tibial site removal.  R tibial IO line removed per MARCELA Sweet RN, patient tolerated poorly due chronic leg pain. Site dressed with gauze pressure dressing. Will monitor per policy.

## 2019-06-04 NOTE — PROGRESS NOTES
Shift assessment as follows. Trach patent, vent settings as followed 18/500/50/12+suctioned x2 sm thin white creamy secretions noted, pt tolerated suctioning without signs and symptoms of difficulty. Breath sounds diminished through out lung fields occasional productive cough noted. Abdomen soft round non tender BS+x4 quads. Bilateral pedal pulses positive. Pt in bed eyes closed, bed in low position. Call light and bedside table within reach. Cont POC.

## 2019-06-04 NOTE — PLAN OF CARE
Problem: Falls - Risk of:  Goal: Will remain free from falls  Description  Will remain free from falls  Outcome: Ongoing  Goal: Absence of physical injury  Description  Absence of physical injury  Outcome: Ongoing     Problem: Risk for Impaired Skin Integrity  Goal: Tissue integrity - skin and mucous membranes  Description  Structural intactness and normal physiological function of skin and  mucous membranes. Outcome: Ongoing     Problem: Discharge Planning:  Goal: Participates in care planning  Description  Participates in care planning  Outcome: Ongoing  Goal: Discharged to appropriate level of care  Description  Discharged to appropriate level of care  Outcome: Ongoing     Problem: Airway Clearance - Ineffective:  Goal: Ability to maintain a clear airway will improve  Description  Ability to maintain a clear airway will improve  Outcome: Ongoing     Problem: Anxiety/Stress:  Goal: Level of anxiety will decrease  Description  Level of anxiety will decrease  Outcome: Ongoing     Problem: Aspiration:  Goal: Absence of aspiration  Description  Absence of aspiration  Outcome: Ongoing     Problem:  Bowel Function - Altered:  Goal: Bowel elimination is within specified parameters  Description  Bowel elimination is within specified parameters  Outcome: Ongoing     Problem: Cardiac Output - Decreased:  Goal: Hemodynamic stability will improve  Description  Hemodynamic stability will improve  Outcome: Ongoing     Problem: Fluid Volume - Imbalance:  Goal: Absence of imbalanced fluid volume signs and symptoms  Description  Absence of imbalanced fluid volume signs and symptoms  Outcome: Ongoing     Problem: Gas Exchange - Impaired:  Goal: Levels of oxygenation will improve  Description  Levels of oxygenation will improve  Outcome: Ongoing     Problem: Mental Status - Impaired:  Goal: Mental status will be restored to baseline  Description  Mental status will be restored to baseline  Outcome: Ongoing     Problem: Nutrition Deficit:  Goal: Ability to achieve adequate nutritional intake will improve  Description  Ability to achieve adequate nutritional intake will improve  Outcome: Ongoing     Problem: Pain:  Goal: Pain level will decrease  Description  Pain level will decrease  Outcome: Ongoing  Goal: Recognizes and communicates pain  Description  Recognizes and communicates pain  Outcome: Ongoing  Goal: Control of acute pain  Description  Control of acute pain  Outcome: Ongoing  Goal: Control of chronic pain  Description  Control of chronic pain  Outcome: Ongoing     Problem: Serum Glucose Level - Abnormal:  Goal: Ability to maintain appropriate glucose levels will improve to within specified parameters  Description  Ability to maintain appropriate glucose levels will improve to within specified parameters  Outcome: Ongoing     Problem: Skin Integrity - Impaired:  Goal: Will show no infection signs and symptoms  Description  Will show no infection signs and symptoms  Outcome: Ongoing  Goal: Absence of new skin breakdown  Description  Absence of new skin breakdown  Outcome: Ongoing     Problem: Sleep Pattern Disturbance:  Goal: Appears well-rested  Description  Appears well-rested  Outcome: Ongoing     Problem: Tissue Perfusion, Altered:  Goal: Circulatory function within specified parameters  Description  Circulatory function within specified parameters  Outcome: Ongoing     Problem: Tissue Perfusion - Cardiopulmonary, Altered:  Goal: Absence of angina  Description  Absence of angina  Outcome: Ongoing  Goal: Hemodynamic stability will improve  Description  Hemodynamic stability will improve  Outcome: Ongoing

## 2019-06-04 NOTE — PROGRESS NOTES
than 8, FiO2 less than 55%. · IV fluid resuscitation with crystalloid  · Monitor lactate  · Broad spectrum antibiotics to include Vanc and Zosyn  · IV vasopressors to keep MAP > 64 mmHg: Levophed and vasopressin  · Hydrocortisone 50mg q6  · S/p FFP received in ED and 10mg IV Vit K  · Had a normal TTE in 12/18  · Place PICC and remove IO  · Follow up on cultures  · Follow electrolytesd  · Consult nephrology HD  · Blood sugar control, with goal 140-180  · Restart Coumadin  · PPI  · Due to at least single organ failure and risk of rapid deterioration, I spent 32 minutes of Critical care time reviewing labs/films, examining patient, collaborating with other physicians. This does not include time performing critical procedures.

## 2019-06-04 NOTE — PROGRESS NOTES
Nutrition Assessment    Type and Reason for Visit: Initial, Consult(consult for TF ordering and management)    Nutrition Recommendations:   1. Continue NPO status until patient is medically cleared to receive nutrition therapy. 2. If patient cannot safely consume po and EN needs to be started - Nepro at 50 ml/hr x 20 hours + 30 ml water flushes every 6 hours. Start TF at 20 ml/hr and increase by 15 ml every 4 hours, as tolerated by patient, until goal rate can be achieved and maintained. Please administer one proteinex 2go twice daily via feeding tube - flush tube well before and after administration. 3. Monitor respiratory function, nutrition progression, and plan of care. 4. Monitor for in-patient weight changes. 5. Monitor nutrition-related labs and bowel function/regimen. Nutrition Assessment: patient is nutritionally compromised AEB patient was at [facility] x 5 months and [facility] x 1 week PTA and he is a chronic trach patient with increased O2 demands and he is at risk for further compromise d/t NPO status, patient is not supposed to consume po nutrition while trach is connected to vent, and emesis PTA; will continue NPO status and monitor nutrition progression/plan of care + provide enteral nutrition support recommendations in case patient cannot safely take po during admission    Malnutrition Assessment:  · Malnutrition Status: At risk for malnutrition  · Context: Acute illness or injury  · Findings of the 6 clinical characteristics of malnutrition (Minimum of 2 out of 6 clinical characteristics is required to make the diagnosis of moderate or severe Protein Calorie Malnutrition based on AND/ASPEN Guidelines):  1. Energy Intake-Unable to assess, Greater than or equal to 5 days    2. Weight Loss-Unable to assess, in 1 year  3. Fat Loss-No significant subcutaneous fat loss,    4. Muscle Loss-No significant muscle mass loss,    5.  Fluid Accumulation-Moderate to severe fluid accumulation, · Goals: patient will adhere to NPO status until he is medically cleared to receive nutrition therapy    · Monitoring: Nutrition Progression, Skin Integrity, Wound Healing, Mental Status/Confusion, Weight, Pertinent Labs, Chewing/Swallowing, Monitor Bowel Function      Electronically signed by Kellie Valladares RD, LD on 6/4/19 at 12:07 PM    Contact Number: [phone]

## 2019-06-05 NOTE — PROGRESS NOTES
Dr Prado updated on discussion regarding code status and pt's code status changed to [facility] Shilpa Merino RN

## 2019-06-05 NOTE — PROGRESS NOTES
Pt remains awake and intermittently agitated. All lines and monitoring devices are in place. Vitals and SpO2 stable. Pt has been medicated with versed IVP for agitation/anxiety and minimal change noted after dose.  No other changes noted in exam. Continue current plan of care Dhruv Baires RN

## 2019-06-05 NOTE — PROGRESS NOTES
Internal Medicine ICU Progress Note      Interval history:   61year old white male with history of CAD, COPD, ESRD on HD for the last four months, chronic tracheostomy for five months - has been an [facility], recently discharged to [facility], one week ago. Admitted now with septic shock, pneumonia   He was weaning off vent and was on trach collar for up to 12 hours a day, in the nursing home. Now back on vent support. Started on CRRT for renal failure. Remains on pressors vasopressin and levophed    He is awake and alert, responding appropriately. Mentation improved. Awake and alert. Patient is clearly indicated that he wants his code status to be DNR CCA. Updated on chart      Invasive Lines: CVC being placed on 19        MV:  On ventilator.  Has tracheostomy    Recent Labs     19  1716 19  0505   PHART 7.418 7.383   [id number] 30.8* 39.0   PO2ART 190.9* 141.8*       MV Settings:  Vent Mode: AC/VC Rate Set: 18 bmp/Vt Ordered: 500 mL/ /FiO2 : 40 %    IV:   sodium chloride      heparin 5000 units CRRT syringe 2 mL/hr at 19 1455    sodium chloride      prismaSATE BGK 4/2.5 2,000 mL/hr (19 1002)    prismaSATE BGK 4/2.5 2,000 mL/hr (19 0612)    prismaSol BGK 4/2.5 200 mL/hr (19 0151)    norepinephrine 10 mcg/min (19 1333)    dextrose      vasopressin (Septic Shock) infusion 0.04 Units/min (19 0730)       Vitals:  Temp  Av.1 °F (36.2 °C)  Min: 96.4 °F (35.8 °C)  Max: 97.6 °F (36.4 °C)  Pulse  Av.4  Min: 85  Max: 108  BP  Min: 85/67  Max: 151/105  SpO2  Av %  Min: 85 %  Max: 100 %  FiO2   Av %  Min: 40 %  Max: 40 %  Patient Vitals for the past 4 hrs:   BP Pulse Resp SpO2   19 1500 -- 106 22 --   19 1400 (!) 136/109 103 17 94 %   19 1300 (!) 151/105 106 15 (!) 85 %   19 1200 97/79 103 15 95 %       CVP:        Intake/Output Summary (Last 24 hours) at 2019 1530  Last data filed at 2019 1500  Gross per 24 hour Intake 3681 ml   Output 3564 ml   Net 117 ml       EXAM:  General: No distress. Alert. Awake and well-oriented   Tracheostomy - >   Ventilator  Obese  NG tube in place  Eyes: PERRL. No sclera icterus. No conjunctiva injected. ENT: No discharge. Pharynx clear. Neck:   Tracheostomy  +  Resp: No accessory muscle use. No crackles. No wheezing. No rhonchi. No dullness on percussion. CV:  Irregular rate and rhythm. No mumur or rub. + edema. No JVD. Palpable pedal pulses. GI: Non-tender. Non-distended. No masses. No organmegaly. Normal bowel sounds. No hernia. Skin: Warm and dry. No nodule on exposed extremities. No rash on exposed extremities. Lymph: No cervical LAD. No supraclavicular LAD. M/S: No cyanosis. No joint deformity. No clubbing. Neuro: Awake. Follows commands. Positive pupils/gag/corneals. Normal pain response. Psych: Oriented to person, place, time. No anxiety or agitation.      Medications:  Scheduled Meds:   warfarin  3 mg Oral Once    darbepoetin daryn-polysorbate  25 mcg Subcutaneous Weekly - Monday    lidocaine 1 % injection  5 mL Intradermal Once    mupirocin   Nasal BID    warfarin (COUMADIN) daily dosing (placeholder)   Other RX Placeholder    cefepime  2 g Intravenous Q12H    sodium chloride  250 mL Intravenous Once    sodium chloride flush  10 mL Intravenous 2 times per day    albuterol sulfate HFA  4 puff Inhalation Q4H    ipratropium  4 puff Inhalation Q4H    hydrocortisone sodium succinate PF  50 mg Intravenous Q6H    chlorhexidine  15 mL Mouth/Throat BID    vancomycin (VANCOCIN) intermittent dosing (placeholder)   Other RX Placeholder    insulin lispro  0-6 Units Subcutaneous 6 times per day       PRN Meds:  phenol, potassium chloride, magnesium sulfate, calcium gluconate IVPB **OR** calcium gluconate IVPB **OR** calcium gluconate IVPB **OR** calcium gluconate IVPB, sodium phosphate IVPB **OR** sodium phosphate IVPB **OR** sodium phosphate IVPB **OR** sodium phosphate IVPB, LORazepam, sodium chloride flush, glucose, dextrose, glucagon (rDNA), dextrose, fentanNYL, midazolam, ipratropium-albuterol    Results:  CBC:   Recent Labs     06/03/19  0719 06/04/19  0408 06/05/19  0654   WBC 10.6 8.7 8.3   HGB 9.2* 11.0* 9.7*   HCT 34.6* 36.7* 32.5*   MCV 90.0 89.7 88.8    272 176     BMP:   Recent Labs     06/04/19  1620 06/05/19  0001 06/05/19  0654   * 131* 131*   K 4.0 3.8 3.6   CL 94* 94* 95*   CO2 25 23 21   PHOS 1.7* 2.0* 1.9*   BUN 29* 29* 26*   CREATININE 3.2* 3.4* 2.8*     LIVER PROFILE:   Recent Labs     06/03/19  0630 06/04/19  0407 06/05/19  0654   AST 22 45* 33   ALT 13 19 21   LIPASE 7.0*  --   --    BILITOT 0.8 2.0* 1.6*   ALKPHOS 212* 249* 221*     PT/INR:   Recent Labs     06/03/19  1223 06/04/19  0407 06/05/19  0654   PROTIME 54.3* 20.1* 15.0*   INR 4.76* 1.76* 1.32*     APTT:   Recent Labs     06/03/19  0719   APTT 67.2*       Cultures:  Respiratory cultures growing pseudomonas and staph aureus    Films:  XR ABDOMEN FOR NG/OG/NE TUBE PLACEMENT   Final Result   Feeding tube tip in region of gastric antrum         IR PICC WO SQ PORT/PUMP > 5 YEARS   Final Result   Successful placement of right internal jugular central line. XR CHEST PORTABLE   Final Result   Right IJ CVC placement without radiographic complication         XR CHEST PORTABLE   Final Result   Medial right basal opacities could reflect atelectasis, aspiration and/or   pneumonia. Stable life support device positioning. Unchanged cardiomegaly with central vascular congestion. Small effusions   suspected. XR CHEST PORTABLE   Final Result   Retrocardiac opacities favored to represent bronchovascular crowding and   atelectasis. Difficult to entirely exclude superimposed aspiration or   pneumonia. Stable cardiomegaly with central vascular congestion. Equivocal for small   effusions.                 Assessment:    Principal Problem:    Septic shock ([address]  Active

## 2019-06-05 NOTE — PROGRESS NOTES
Pt with agitation being aggressive hitting at staff, pulling at lines and equipment. Perfect Served  related to pt being aggressive and agitated. Wife at bedside requesting staff to apply soft restraints on pt. Applied per wife request and pt safety.

## 2019-06-05 NOTE — PROGRESS NOTES
Placeholder    cefepime  2 g Intravenous Q12H    LORazepam        sodium chloride  250 mL Intravenous Once    sodium chloride flush  10 mL Intravenous 2 times per day    albuterol sulfate HFA  4 puff Inhalation Q4H    ipratropium  4 puff Inhalation Q4H    hydrocortisone sodium succinate PF  50 mg Intravenous Q6H    chlorhexidine  15 mL Mouth/Throat BID    vancomycin (VANCOCIN) intermittent dosing (placeholder)   Other RX Placeholder    insulin lispro  0-6 Units Subcutaneous 6 times per day     PRN Meds:  phenol, potassium chloride, magnesium sulfate, calcium gluconate IVPB **OR** calcium gluconate IVPB **OR** calcium gluconate IVPB **OR** calcium gluconate IVPB, sodium phosphate IVPB **OR** sodium phosphate IVPB **OR** sodium phosphate IVPB **OR** sodium phosphate IVPB, LORazepam, sodium chloride flush, glucose, dextrose, glucagon (rDNA), dextrose, fentanNYL, midazolam, ipratropium-albuterol    Results:  CBC:   Recent Labs     06/03/19  0719 06/04/19  0408 06/05/19  0654   WBC 10.6 8.7 8.3   HGB 9.2* 11.0* 9.7*   HCT 34.6* 36.7* 32.5*   MCV 90.0 89.7 88.8    272 176     BMP:   Recent Labs     06/04/19  1620 06/05/19  0001 06/05/19  0654   * 131* 131*   K 4.0 3.8 3.6   CL 94* 94* 95*   CO2 25 23 21   PHOS 1.7* 2.0* 1.9*   BUN 29* 29* 26*   CREATININE 3.2* 3.4* 2.8*     LIVER PROFILE:   Recent Labs     06/03/19  0630 06/04/19  0407 06/05/19  0654   AST 22 45* 33   ALT 13 19 21   LIPASE 7.0*  --   --    BILITOT 0.8 2.0* 1.6*   ALKPHOS 212* 249* 221*       Cultures:  Blood ngtd  resp neg    Films:  CXR was reviewed by me and it showed   Medial right basal opacities could reflect atelectasis, aspiration and/or   pneumonia.       Stable life support device positioning.       Unchanged cardiomegaly with central vascular congestion.  Small effusions   suspected.        ASSESSMENT:  · Shock - suspect secondary to sepsis, possibly pneumonia given episode of emesis with LOC at NH  · Acute hypoxic

## 2019-06-05 NOTE — PROGRESS NOTES
Pt repositioned in bed and ken care completed. Wet to dry dressing change completed to sacral wound. mepilex dressing replaced on bilat buttocks and dressing to right IO site changed. Pt was incont of mod amt of soft stool. Tolerated transfer well and assists some with turn.  Cesar Hernandez RN

## 2019-06-06 NOTE — PROGRESS NOTES
Internal Medicine ICU Progress Note      Interval history:   61year old white male with history of CAD, COPD, ESRD on HD for the last four months, chronic tracheostomy for five months - has been at [facility], recently discharged to [facility], one week ago. Admitted now with septic shock, pneumonia   He was weaning off vent and was on trach collar for up to 12 hours a day, in the nursing home. Now back on vent support. Started on CRRT for renal failure. Remains on pressors vasopressin and levophed    He is awake and alert, responding appropriately. Mentation improved. Awake and alert. Patient is clearly indicated that he wants his code status to be DNR CCA. Updated on chart        Patient is stable today on mechanical ventilation. Resting better. Remains on pressors. Afebrile. Continued on CRRT. Invasive Lines: CVC being placed on 19        MV:  On ventilator.  Has tracheostomy    Recent Labs     19  1716 19  0505   PHART 7.418 7.383   [id number] 30.8* 39.0   PO2ART 190.9* 141.8*       MV Settings:  Vent Mode: AC/VC Rate Set: 18 bmp/Vt Ordered: 500 mL/ /FiO2 : 30 %    IV:   heparin 5000 units CRRT syringe 2 mL/hr at 19 0944    prismaSATE BGK 4/2.5 1,500 mL/hr (19 1038)    prismaSATE BGK 4/2.5 2,000 mL/hr (19 0700)    prismaSol BGK 4/2.5 500 mL/hr (19 1038)    norepinephrine 5 mcg/min (19 1006)    dextrose      vasopressin (Septic Shock) infusion 0.04 Units/min (19 0806)       Vitals:  Temp  Av.1 °F (36.2 °C)  Min: 95.4 °F (35.2 °C)  Max: 98.4 °F (36.9 °C)  Pulse  Av.5  Min: 86  Max: 118  BP  Min: 88/54  Max: 151/105  SpO2  Av.5 %  Min: 76 %  Max: 100 %  FiO2   Av.4 %  Min: 30 %  Max: 40 %  Patient Vitals for the past 4 hrs:   BP Temp Temp src Pulse Resp SpO2   19 1100 100/70 -- -- 92 20 100 %   19 1000 (!) 88/54 -- -- 86 19 99 %   19 0900 100/89 -- -- 99 20 90 %   19 0800 92/68 97.8 °F (36.6 °C) Oral 97 19 100 %       CVP:        Intake/Output Summary (Last 24 hours) at 6/6/2019 1133  Last data filed at 6/6/2019 1100  Gross per 24 hour   Intake 3676 ml   Output 3511 ml   Net 165 ml       EXAM:  General: No distress. Alert. Awake and well-oriented   Tracheostomy - >   Ventilator  Obese  NG tube in place  Eyes: PERRL. No sclera icterus. No conjunctiva injected. ENT: No discharge. Pharynx clear. Neck:   Tracheostomy  +  Resp: No accessory muscle use. No crackles. No wheezing. No rhonchi. No dullness on percussion. CV:  Irregular rate and rhythm. No mumur or rub. + edema. No JVD. Palpable pedal pulses. GI: Non-tender. Non-distended. No masses. No organmegaly. Normal bowel sounds. No hernia. Skin: Warm and dry. No nodule on exposed extremities. No rash on exposed extremities. Lymph: No cervical LAD. No supraclavicular LAD. M/S: No cyanosis. No joint deformity. No clubbing. Neuro: Awake. Follows commands. Positive pupils/gag/corneals. Normal pain response. Psych: Oriented to person, place, time. No anxiety or agitation.      Medications:  Scheduled Meds:   sodium phosphate IVPB  30 mmol Intravenous Once    hydrocortisone sodium succinate PF  50 mg Intravenous Q12H    calcium gluconate IVPB  1 g Intravenous Once    darbepoetin daryn-polysorbate  25 mcg Subcutaneous Weekly - Monday    collagenase   Topical Daily    lidocaine 1 % injection  5 mL Intradermal Once    mupirocin   Nasal BID    warfarin (COUMADIN) daily dosing (placeholder)   Other RX Placeholder    cefepime  2 g Intravenous Q12H    sodium chloride  250 mL Intravenous Once    sodium chloride flush  10 mL Intravenous 2 times per day    albuterol sulfate HFA  4 puff Inhalation Q4H    ipratropium  4 puff Inhalation Q4H    chlorhexidine  15 mL Mouth/Throat BID    insulin lispro  0-6 Units Subcutaneous 6 times per day       PRN Meds:  phenol, potassium chloride, magnesium sulfate, calcium gluconate IVPB **OR** calcium gluconate IVPB central vascular congestion. Equivocal for small   effusions. Assessment:    Principal Problem:    Septic shock ([facility])  Active Problems:    Coronary artery disease involving native coronary artery of native heart without angina pectoris    Chronic atrial fibrillation (HCC)    Morbid obesity with BMI of 60.0-69.9, adult (HCC)    COPD (chronic obstructive pulmonary disease) ([facility])    DMII (diabetes mellitus, type 2) ([facility])    ELODIA treated with BiPAP    Supratherapeutic INR    Lactic acidosis    Aspiration pneumonia (HCC)    Acute respiratory failure with hypoxia ([facility])  Resolved Problems:    * No resolved hospital problems. *         Plan:    #  Septic shock. Presentation consistent with septic shock. His BP is low. He is on Levophed and Vasopressin. He was fluid resuscitated. His BP is improved. Wean off pressors as tolerated  -Continue  Stress dose of steroids- Hydrocortisone.     #  Acute respiratory failure. Has chronic tracheostomy . Placed on the ventilator. Try to wean to trach collar     #  Aspiration pneumonia. Placed on Vancomycin and Zosyn. Then switched to vancomycin and cefepime due to renal failure. Respiratory Cultures growing pseudomonas and staph- MSSA. Vancomycin has been discontinued. Continue cefepime now     #  Lactic acidosis due to sepsis. Improving.     #  A fib with RVR due to above. HR better    #  Coumadin toxicity corrected with FFP. Resume warfarin.      #  ESRD:  Nephrology consult. Continue CRRT     #  Morbid Obesity. Body mass index is 54.34 kg/m². Complicating assessment and treatment. Placing patient at risk for multiple co-morbidities as well as early death and contributing to the patient's presentation.      #  DM type 2 monitor sugars.        DVT prophylaxis- on coumadin    DIET TUBE FEED CONTINUOUS/CYCLIC NPO; Renal Formula; Nasogastric; Continuous; 20; 50; 20   DNR-VICKIE Pangr 11:33 AM 6/6/2019

## 2019-06-06 NOTE — PROGRESS NOTES
Pulmonary & Critical Care Medicine ICU Progress Note    CC: Shock, resp failure    Events of Last 24 hours: tolerating CRRT. Sleeping this morning  Levo 3  Vaso 0.04    Invasive Lines: IV: IO  MV:  D  Vent Mode: AC/VC Rate Set: 18 bmp/Vt Ordered: 500 mL/ /FiO2 : 40 %  Recent Labs     06/03/19  1716 06/04/19  0505   PHART 7.418 7.383   [id number] 30.8* 39.0   PO2ART 190.9* 141.8*       IV:   heparin 5000 units CRRT syringe 2 mL/hr at 06/05/19 2301    prismaSATE BGK 4/2.5 2,000 mL/hr (06/06/19 0456)    prismaSATE BGK 4/2.5 2,000 mL/hr (06/06/19 0700)    prismaSol BGK 4/2.5 200 mL/hr (06/05/19 0151)    norepinephrine 3 mcg/min (06/06/19 3915)    dextrose      vasopressin (Septic Shock) infusion 0.04 Units/min (06/06/19 0114)       Vitals:  BP 99/71   Pulse 101   Temp 96.2 °F (35.7 °C) (Temporal)   Resp 22   Ht 5' 9\" (1.753 m)   Wt (!) 378 lb 6.4 oz (171.6 kg)   SpO2 98%   BMI 55.88 kg/m²   on 40%    Intake/Output Summary (Last 24 hours) at 6/6/2019 0759  Last data filed at 6/6/2019 0700  Gross per 24 hour   Intake 3405 ml   Output 3874 ml   Net -469 ml     EXAM:  Constitutional: ill appearing. Eyes: PERRL. No sclera icterus. ENT: Normal Nose. Normal Tongue. Neck:  Trachea is midline. No thyroid tenderness. Respiratory: No accessory muscle usage. decreased breath sounds. No wheezes. No rales. No Rhonchi. Cardiovascular: Normal S1S2. Cesar Honour No murmur. No digit cyanosis. No digit clubbing. No LE edema. GI: Non-tender. Non-distended. Normal bowel sounds. No masses. No umbilical hernia. Skin: No rash on the exposed extremities. No Nodules on exposed extremities. Neuro: Alert. Follows commands. Moves all extremities. Psych: Alert. Oriented No agitation, no anxiety.     Scheduled Meds:   darbepoetin daryn-polysorbate  25 mcg Subcutaneous Weekly - Monday    collagenase   Topical Daily    lidocaine 1 % injection  5 mL Intradermal Once    mupirocin   Nasal BID    warfarin (COUMADIN) daily dosing respiratory failure requiring mechanical ventilation through his chronic tracheostomy  · Was tolerating CATC day and Full vent support at night for 1 week at [facility]  · COPD with likely exacerbation  · ELODIA/OHS  · Afib on Coumadin  · Coagulopathy  · DM2  · Chronic diastolic CHF  · ESRD on HD  · CAD with RAMANA to LAD  · Elevated troponin     PLAN:  · PSV daily as tolerated  · Mechanical ventilation as per my orders. The ventilator was adjusted by me at the bedside for unstable, life threatening respiratory failure. · IV Propofol for sedation, target RASS -2, with daily spontaneous awakening trial.  Fentanyl PRN pain, gtt as needed  · Head of bed 30 degrees or higher at all times  · Daily spontaneous breathing trial once PEEP less than 8, FiO2 less than 55%. · Broad spectrum antibiotics to include Vanc and Zosyn Day#4      · Stop Vanc  · IV vasopressors to keep MAP > 64 mmHg: Levophed and vasopressin  · Hydrocortisone  - wean to q12hr  · S/p FFP received in ED and 10mg IV Vit K  · Had a normal TTE in 12/18  · tolerating TF  · CRRT per Nephro -running  even  · Blood sugar control, with goal 140-180  · Coumadin  · PPI  · Due to at least single organ failure and risk of rapid deterioration, I spent 32 minutes of Critical care time reviewing labs/films, examining patient, collaborating with other physicians. This does not include time performing critical procedures.

## 2019-06-06 NOTE — PROGRESS NOTES
Nephrology Progress Note   [email or web address]      This patient is a 61year old male whom we are following for ESRD. Subjective: The patient was seen and examined on CRRT. No more clotting issues overnight, Tolerating UF goal of even. Levophed titrated down, still on vasopressin. Family History: No family at bedside  ROS: Unable to obtain      Vitals:  BP (!) 88/54   Pulse 86   Temp 97.8 °F (36.6 °C) (Oral)   Resp 19   Ht 5' 9\" (1.753 m)   Wt (!) 378 lb 6.4 oz (171.6 kg)   SpO2 99%   BMI 55.88 kg/m²   I/O last 3 completed shifts: In: 7294 [I.V.:2144; NG/GT:1261]  Out: 3874   I/O this shift:  In: 640 [I.V.:311; Other:189; NG/GT:140]  Out: 237     Physical Exam:  Physical Exam   Constitutional: No distress. HENT:   Head: Normocephalic and atraumatic. Eyes: Conjunctivae are normal. No scleral icterus. Neck:   Trache in place   Cardiovascular: Normal rate. Exam reveals no gallop and no friction rub. Pulmonary/Chest:   Equal chest expansion, coarse breath sounds bilateral   Abdominal: Soft. Bowel sounds are normal. He exhibits no distension. There is no tenderness. Musculoskeletal: He exhibits edema. Skin: Skin is warm. Vitals reviewed.     Access: RIJ TDC, TAYE AVBARRY      Medications:   sodium phosphate IVPB  30 mmol Intravenous Once    hydrocortisone sodium succinate PF  50 mg Intravenous Q12H    vancomycin  1,250 mg Intravenous Once    calcium gluconate IVPB  1 g Intravenous Once    darbepoetin daryn-polysorbate  25 mcg Subcutaneous Weekly - Monday    collagenase   Topical Daily    lidocaine 1 % injection  5 mL Intradermal Once    mupirocin   Nasal BID    warfarin (COUMADIN) daily dosing (placeholder)   Other RX Placeholder    cefepime  2 g Intravenous Q12H    sodium chloride  250 mL Intravenous Once    sodium chloride flush  10 mL Intravenous 2 times per day    albuterol sulfate HFA  4 puff Inhalation Q4H    ipratropium  4 puff Inhalation Q4H    chlorhexidine  15 mL

## 2019-06-06 NOTE — CARE COORDINATION
Case Management Assessment  Initial Evaluation    Date/Time of Evaluation: 2019 10:53 AM  Assessment Completed by: Emilia Ladd    Patient Name: Yemi Thomason  YOB: 1960  Diagnosis: Shock ([address] [R57.9]  Date / Time: 6/3/2019  5:16 AM  Admission status/Date:6/3/19 3169 inpt  Chart Reviewed: Yes      Patient Interviewed: No , as pt is on a vent  Family Interviewed:  Yes - Met message for wife, Adryan Mosley ([phone])      Hospitalization in the last 30 days:  No    Contacts  :     Relationship to Patient:   Phone Number:    Alternate Contact:     Relationship to Patient:     Phone Number:    Met with:    Current PCP Zelalem Vivar MD    Financial  Medicare  Precert required for SNF : Aleksandr Bishop        3 night stay required - Y, N    ADLS  Support Systems: Family Members  Transportation: EMS transportation    Meal Preparation: [address] Environment: [facility] LTC  Steps: 0  Plans to Return to Present Housing: Yes  Other Identified Issues: chronic vent/trach    Home Care Information  Currently active with  [address] : No     Passport/Waiver : No  :                      Phone Number:    Passport/Waiver Services:           Durable Medical Equipment   DME Provider: Chronic vent/trach  Equipment: yesWalker__Cane__RTS__ BSC__Shower Chair__  02__ HHN__ CPAP__  BiPap__  Hospital Bed__ W/C___ Other___vent/trach_______      Has Home O2 in place on admit:  Chronic vent/trach--at [address] [place] [address] Drive of need to bring portable home O2 tank on day of discharge for nursing to connect prior to leavin [facility]uel  Verbalized agreement/Understandin [facility] Service Affiliation  Dialysis:  No        Outpatient PT/OT: No    Cancer Center: No     CHF Clinic: No     Pulmonary Rehab: No  Pain Clinic: No  Community Mental Health: No    Wound Clinic: No     Other:     DISCHARGE PLAN: Reviewed chart.   Role of discharge planner explained and patient unable to verbalized

## 2019-06-06 NOTE — DISCHARGE INSTR - COC
Right; Upper 1.5x1.5 Unstagable eschar/slough (Active)   Wound Pressure Unstageable 6/3/2019 10:00 PM   Dressing Status Changed 6/6/2019  2:00 AM   Dressing Changed Changed/New 6/6/2019  2:00 AM   Dressing/Treatment ABD 6/6/2019  2:00 AM   Wound Cleansed Wound cleanser 6/6/2019  2:00 AM   Odor Mild 6/6/2019  2:00 AM   Number of days: 2       Wound 06/03/19 Buttocks Right;Upper 0.5x6.5 Stage II (Active)   Wound Pressure Stage  2 6/3/2019 10:00 PM   Dressing Status Changed 6/6/2019  2:00 AM   Dressing Changed Changed/New 6/6/2019  2:00 AM   Dressing/Treatment Foam 6/6/2019  2:00 AM   Wound Cleansed Wound cleanser 6/3/2019 10:00 PM   Odor Mild 6/6/2019  2:00 AM   Number of days: 2       Wound 06/03/19 Buttocks Right; Lower 0.5x0.3 (Active)   Wound Pressure Stage  2 6/3/2019 10:00 PM   Dressing Status Changed 6/6/2019  2:00 AM   Dressing Changed Changed/New 6/6/2019  2:00 AM   Dressing/Treatment Foam 6/6/2019  2:00 AM   Wound Cleansed Wound cleanser 6/6/2019  2:00 AM   Odor Mild 6/6/2019  2:00 AM   Number of days: 2       Wound 06/03/19 Buttocks Right; Lower 1.5x1.5 Stage II (Active)   Wound Pressure Stage  2 6/3/2019 10:00 PM   Dressing Status Changed 6/6/2019  2:00 AM   Dressing Changed Changed/New 6/6/2019  2:00 AM   Dressing/Treatment ABD 6/6/2019  2:00 AM   Wound Cleansed Rinsed/Irrigated with saline 6/5/2019  3:00 AM   Odor Mild 6/6/2019  2:00 AM   Number of days: 2       Wound 06/03/19 Thigh Proximal;Right; Inner 0.5x1.5 Unstagable (Active)   Wound Pressure Unstageable 6/3/2019 10:00 PM   Dressing Status New drainage 6/6/2019  2:00 AM   Dressing Changed Changed/New 6/6/2019  2:00 AM   Dressing/Treatment Foam 6/6/2019  2:00 AM   Wound Cleansed Wound cleanser 6/6/2019  2:00 AM   Odor Mild 6/6/2019  2:00 AM   Number of days: 2       Wound 06/03/19 Thigh Proximal;Right 1.5x2 Unstagable (Active)   Wound Pressure Unstageable 6/3/2019 10:00 PM   Dressing Status Changed 6/6/2019  2:00 AM   Dressing Changed Changed/New with patient):  {CHP DME Belongings:333168430}    RN SIGNATURE:  {Esignature:[id number]}    CASE MANAGEMENT/SOCIAL WORK SECTION    Inpatient Status Date: 6/3/19    Readmission Risk Assessment Score:  Readmission Risk              Risk of Unplanned Readmission:        30           Discharging to Facility/ Agency     Name: [facility])  Address: [address] , [place], [place], [facility]  Phone:[phone]  CL:[phone]          / signature: {Esignature:866406246}    PHYSICIAN SECTION    Prognosis: {Prognosis:4428502060}    Condition at Discharge: [address] [place] [address] Patient Condition:[id number]}    Rehab Potential (if transferring to Rehab): {Prognosis:5026119250}    Recommended Labs or Other Treatments After Discharge: ***    Physician Certification: I certify the above information and transfer of Dana Lauren  is necessary for the continuing treatment of the diagnosis listed and that he requires {Admit to Appropriate Level of Care:21213} for {GREATER/LESS:644253611} 30 days.      Update Admission H&P: [facility]}    PHYSICIAN SIGNATURE:  {Esignature:928754583}

## 2019-06-07 NOTE — PROGRESS NOTES
Nephrology Progress Note   [email or web address]      This patient is a 61year old male whom we are following for ESRD. Subjective: The patient was seen and examined on CRRT. No clotting issues. Off vasopressin. On 7mcg/min of Levophed. Able to tolerate goal UF of even. Family History: Family at bedside and questions were answered. ROS: (+) SOB. No abdominal pain      Vitals:  BP (!) 134/99   Pulse 112   Temp 97.5 °F (36.4 °C) (Oral)   Resp 16   Ht 5' 9\" (1.753 m)   Wt (!) 381 lb 13.4 oz (173.2 kg)   SpO2 100%   BMI 56.39 kg/m²   I/O last 3 completed shifts: In: 9530 [I.V.:2315; Other:189; NG/GT:1228]  Out: 3224   No intake/output data recorded. Physical Exam:  Physical Exam   Constitutional: No distress. HENT:   Head: Normocephalic and atraumatic. Eyes: Conjunctivae are normal. No scleral icterus. Neck:   Trache in place   Cardiovascular: Normal rate. Exam reveals no gallop and no friction rub. Pulmonary/Chest:   Equal chest expansion, coarse breath sounds bilateral   Abdominal: Soft. Bowel sounds are normal. He exhibits no distension. There is no tenderness. Musculoskeletal: He exhibits edema. Skin: Skin is warm. Vitals reviewed.     Access: TAYE METCALF      Medications:   [START ON 6/8/2019] hydrocortisone sodium succinate PF  50 mg Intravenous Q24H    sodium phosphate IVPB  12 mmol Intravenous Once    darbepoetin daryn-polysorbate  25 mcg Subcutaneous Weekly - Monday    collagenase   Topical Daily    lidocaine 1 % injection  5 mL Intradermal Once    mupirocin   Nasal BID    warfarin (COUMADIN) daily dosing (placeholder)   Other RX Placeholder    cefepime  2 g Intravenous Q12H    sodium chloride  250 mL Intravenous Once    sodium chloride flush  10 mL Intravenous 2 times per day    albuterol sulfate HFA  4 puff Inhalation Q4H    ipratropium  4 puff Inhalation Q4H    chlorhexidine  15 mL Mouth/Throat BID    insulin lispro  0-6 Units Subcutaneous 6 times per day

## 2019-06-07 NOTE — PROGRESS NOTES
Pulmonary & Critical Care Medicine ICU Progress Note    CC: Shock, resp failure    Events of Last 24 hours:   Sleeping better  Levo 7  Vaso 0.04    Invasive Lines: IV: IO  MV:  Chronic trach  Vent Mode: AC/VC Rate Set: 18 bmp/Vt Ordered: 500 mL/ /FiO2 : 30 %  No results for input(s): PHART, FEN2STR, PO2ART in the last 72 hours. IV:   heparin 5000 units CRRT syringe 2 mL/hr at 06/06/19 2018    prismaSATE BGK 4/2.5 1,500 mL/hr (06/07/19 0605)    prismaSATE BGK 4/2.5 2,000 mL/hr (06/07/19 0701)    prismaSol BGK 4/2.5 500 mL/hr (06/06/19 2307)    norepinephrine 7 mcg/min (06/06/19 2250)    dextrose      vasopressin (Septic Shock) infusion 0.04 Units/min (06/07/19 0439)       Vitals:  /78   Pulse 104   Temp 97.5 °F (36.4 °C) (Oral)   Resp 20   Ht 5' 9\" (1.753 m)   Wt (!) 381 lb 13.4 oz (173.2 kg)   SpO2 100%   BMI 56.39 kg/m²   on 40%    Intake/Output Summary (Last 24 hours) at 6/7/2019 0731  Last data filed at 6/7/2019 0300  Gross per 24 hour   Intake 2869 ml   Output 2765 ml   Net 104 ml     EXAM:  Constitutional: ill appearing. Eyes: PERRL. No sclera icterus. ENT: Normal Nose. Normal Tongue. Neck:  Trachea is midline. No thyroid tenderness. Respiratory: No accessory muscle usage. decreased breath sounds. No wheezes. No rales. No Rhonchi. Cardiovascular: Normal S1S2. Veronia Leslie No murmur. No digit cyanosis. No digit clubbing. No LE edema. GI: Non-tender. Non-distended. Normal bowel sounds. No masses. No umbilical hernia. Skin: No rash on the exposed extremities. No Nodules on exposed extremities. Neuro: Alert. Follows commands. Moves all extremities. Psych: Alert. Oriented No agitation, no anxiety.     Scheduled Meds:   hydrocortisone sodium succinate PF  50 mg Intravenous Q12H    darbepoetin daryn-polysorbate  25 mcg Subcutaneous Weekly - Monday    collagenase   Topical Daily    lidocaine 1 % injection  5 mL Intradermal Once    mupirocin   Nasal BID    warfarin (COUMADIN) daily dosing (placeholder)   Other RX Placeholder    cefepime  2 g Intravenous Q12H    sodium chloride  250 mL Intravenous Once    sodium chloride flush  10 mL Intravenous 2 times per day    albuterol sulfate HFA  4 puff Inhalation Q4H    ipratropium  4 puff Inhalation Q4H    chlorhexidine  15 mL Mouth/Throat BID    insulin lispro  0-6 Units Subcutaneous 6 times per day     PRN Meds:  phenol, potassium chloride, magnesium sulfate, calcium gluconate IVPB **OR** calcium gluconate IVPB **OR** calcium gluconate IVPB **OR** calcium gluconate IVPB, sodium phosphate IVPB **OR** sodium phosphate IVPB **OR** sodium phosphate IVPB **OR** sodium phosphate IVPB, LORazepam, sodium chloride flush, glucose, dextrose, glucagon (rDNA), dextrose, fentanNYL, midazolam, ipratropium-albuterol    Results:  CBC:   Recent Labs     06/05/19  0654 06/06/19  0701   WBC 8.3 5.6   HGB 9.7* 9.5*   HCT 32.5* 31.7*   MCV 88.8 89.4    114*     BMP:   Recent Labs     06/06/19  0701 06/06/19  1605 06/06/19  2356   * 137 137   K 3.6 3.8 3.8   CL 95* 100 102   CO2 24 24 27   PHOS 1.8* 1.8* 1.4*   BUN 13 11 9   CREATININE 1.1 0.9 0.7*     LIVER PROFILE:   Recent Labs     06/05/19  0654 06/06/19  0701   AST 33 42*   ALT 21 29   BILITOT 1.6* 1.0   ALKPHOS 221* 262*       Cultures:  Blood ngtd  6/3/19 SPutum:  Pseudomonas and MSSA    Films:  CXR was reviewed by me and it showed   Medial right basal opacities could reflect atelectasis, aspiration and/or   pneumonia.       Stable life support device positioning.       Unchanged cardiomegaly with central vascular congestion.  Small effusions   suspected.        ASSESSMENT:  · Shock - suspect secondary to sepsis, possibly pneumonia given episode of emesis with LOC at NH  · Acute hypoxic respiratory failure requiring mechanical ventilation through his chronic tracheostomy  · Was tolerating CATC day and Full vent support at night for [facility] at [facility]  · COPD with likely exacerbation  · ELODIA/OHS  · Afib on Coumadin  · Coagulopathy  · DM2  · Chronic diastolic CHF  · ESRD on HD  · CAD with RAMANA to LAD  · Elevated troponin     PLAN:  · PSV daily as tolerated  · Mechanical ventilation as per my orders. The ventilator was adjusted by me at the bedside for unstable, life threatening respiratory failure. · IV Propofol for sedation, target RASS -2, with daily spontaneous awakening trial.  Fentanyl PRN pain, gtt as needed  · Head of bed 30 degrees or higher at all times  · Daily spontaneous breathing trial once PEEP less than 8, FiO2 less than 55%. · Broad spectrum antibiotics to include Zosyn Day#5; stopped  Vanc after 4 days  · IV vasopressors to keep MAP > 64 mmHg: Levophed and vasopressin  · Stop Vasopressin  · Hydrocortisone  - wean to daily  · S/p FFP received in ED and 10mg IV Vit K  · Had a normal TTE in 12/18  · tolerating TF  · Replace phos  · CRRT per Nephro   · Blood sugar control, with goal 140-180  · Coumadin  · PPI  · Due to at least single organ failure and risk of rapid deterioration, I spent 32 minutes of Critical care time reviewing labs/films, examining patient, collaborating with other physicians. This does not include time performing critical procedures.

## 2019-06-07 NOTE — PROGRESS NOTES
This note also relates to the following rows which could not be included:  Pulse - Cannot attach notes to unvalidated device data  Resp - Cannot attach notes to unvalidated device data    @FLONOTEDATA(<SUBSCRIPT> error)@

## 2019-06-07 NOTE — CONSULTS
[address] [facility]  Wound Care Referral Note    NAME:  Debbi Mcintosh  MEDICAL RECORD NUMBER:  [id number]  AGE: 61 y.o. GENDER: male  : 1960  TODAY'S DATE:  2019    Subjective:     Reason for Wound Care Evaluation and Assessment: Following patient with multiple wounds to the posterior buttocks and thighs. Chronic wounds in various stages of healing with pink open tissue, intact redness and red scabbed excoriation from scratching. The left buttock superior to the rectum appears as a Full thickness, Stage 3 Pressure Injury. However, this is a resolving abscess wound as per family member. The RLE anterior, has a wound with maroon/purple intact skin and  areas of unroofed blisters. This was caused by wraps to the RLE as per Family member. Pt reluctant to turn and reposition for stool incontinence, assessment, and routine turning for mobility.      Debbi Mcintosh is a 61 y.o. male referred by:   [] Physician  [x] Nursing  [] Other:     Wound Identification:  Wound Type: venous, pressure and non-healing surgical  Contributing Factors: edema, venous stasis, diabetes, chronic pressure, decreased mobility, obesity, incontinence of stool, incontinence of urine and Refusal to reposition in bed        PAST MEDICAL HISTORY        Diagnosis Date    Atrial fibrillation ([address]     CAD (coronary artery disease)     Diastolic heart failure ([address]     Obesity     Type II or unspecified type diabetes mellitus without mention of complication, not stated as uncontrolled        PAST SURGICAL HISTORY    Past Surgical History:   Procedure Laterality Date    CORONARY ANGIOPLASTY WITH STENT PLACEMENT      stents x 2     KNEE SURGERY         FAMILY HISTORY    Family History   Problem Relation Age of Onset    Heart Disease Mother         stent       SOCIAL HISTORY    Social History     Tobacco Use    Smoking status: Former Smoker     Packs/day: 1.00     Years: 25.00     Pack years: 25.00     Last attempt to quit: 2013     Years since quittin.4    Smokeless tobacco: Never Used   Substance Use Topics    Alcohol use: Yes     Comment: couple x year maybe    Drug use: No       ALLERGIES    No Known Allergies    MEDICATIONS    No current facility-administered medications on file prior to encounter. Current Outpatient Medications on File Prior to Encounter   Medication Sig Dispense Refill    acetaminophen (TYLENOL) 325 MG tablet Take 650 mg by mouth every 6 hours as needed for Pain      insulin glargine (BASAGLAR KWIKPEN) 100 UNIT/ML injection pen Inject 5 Units into the skin nightly      benzonatate (TESSALON) 100 MG capsule Take 100 mg by mouth 3 times daily as needed for Cough      bisacodyl (BISACODYL) 5 MG EC tablet Take 10 mg by mouth daily as needed for Constipation      bisacodyl (DULCOLAX) 10 MG suppository Place 10 mg rectally daily as needed for Constipation      diltiazem (CARDIZEM) 30 MG tablet Take 30 mg by mouth 2 times daily      famotidine (PEPCID) 20 MG tablet Take 20 mg by mouth 2 times daily      fluticasone (FLONASE) 50 MCG/ACT nasal spray 1 spray by Each Nostril route daily      gabapentin (NEURONTIN) 100 MG capsule Take 100 mg by mouth daily. Indications: Give 100mg by mouth one time a day every Tuesday, Thursday, and saturday for neuropathy. Give on  days.  glucose (GLUTOSE) 40 % GEL Take 15 g by mouth as needed      guaiFENesin (ROBITUSSIN) 100 MG/5ML syrup Take 200 mg by mouth 4 times daily as needed for Cough      insulin lispro (HUMALOG) 100 UNIT/ML injection vial Inject into the skin 3 times daily (before meals)      ammonium lactate (LAC-HYDRIN) 12 % cream Apply topically as needed for Dry Skin (Apply to BLE topically one time a day for dry skin.) Apply topically as needed.       lactulose (CEPHULAC) 20 g packet Take 20 g by mouth 3 times daily      Lidocaine 4 % LOTN Apply topically      loratadine (CLARITIN) 10 MG tablet Take 10 mg by mouth daily      melatonin 3 MG TABS tablet Take 3 mg by mouth nightly as needed      calcitonin (MIACALCIN) 200 UNIT/ACT nasal spray 1 spray by Nasal route daily      midodrine (PROAMATINE) 10 MG tablet Take 10 mg by mouth daily Indications: Give 10mg by mouthone time a day every tues, thursday, saturday for hypotension. Give 1 tab pre and mid HD.  midodrine (PROAMATINE) 10 MG tablet Take 10 mg by mouth 3 times daily      montelukast (SINGULAIR) 10 MG tablet Take 10 mg by mouth nightly      ondansetron (ZOFRAN) 4 MG tablet Take 4 mg by mouth every 6 hours as needed for Nausea or Vomiting      oxyCODONE 5 MG capsule Take 10 mg by mouth every 4 hours as needed for Pain.  scopolamine (TRANSDERM-SCOP) transdermal patch Place 1 patch onto the skin every 72 hours      senna (SENOKOT) 8.6 MG tablet Take 1 tablet by mouth 2 times daily      sevelamer (RENVELA) 800 MG tablet Take 1 tablet by mouth 3 times daily (with meals)      sodium chloride, Inhalant, 3 % nebulizer solution Take 4 mLs by nebulization every 6 hours as needed for Other      traZODone (DESYREL) 100 MG tablet Take 100 mg by mouth nightly      warfarin (COUMADIN) 3 MG tablet Take 3 mg by mouth daily      ipratropium-albuterol (DUONEB) 0.5-2.5 (3) MG/3ML SOLN nebulizer solution Inhale 1 vial into the lungs every 4 hours      gabapentin (NEURONTIN) 300 MG capsule Take 100 mg by mouth nightly as needed.  atorvastatin (LIPITOR) 40 MG tablet Take 1 tablet by mouth daily (Patient taking differently: Take 20 mg by mouth daily ) 90 tablet 3    albuterol (PROVENTIL HFA;VENTOLIN HFA) 108 (90 BASE) MCG/ACT inhaler Inhale 2 puffs into the lungs every 4 hours as needed for Wheezing.       allopurinol (ZYLOPRIM) 300 MG tablet Take 100 mg by mouth daily            Objective:      BP (!) 90/58   Pulse 114   Temp 98.6 °F (37 °C)   Resp 14   Ht 5' 9\" (1.753 m)   Wt (!) 381 lb 13.4 oz (173.2 kg)   SpO2 100%   BMI 56.39 kg/m²   Demario Risk Score: Demario Scale Score: 06/03/19 Thigh Proximal;Right;Dorsal 1.5x1.5 Unstagable-Dressing/Treatment: ABD, Santyl  Wound 06/03/19 Leg Lower; Anterior;Right Right Anterior lower leg. blisters unroofing-Dressing/Treatment: Non adherent, ABD(UnderPad wrap)   Pressure Injury Prevention and Wound treatments in place per Protocols. Skin fragile - to use tape sparingly. Photos: 6/7/2019 Buttocks      Right anterior Pre-tibial area. Specialty Bed Required : Yes   [] Low Air Loss   [] Pressure Redistribution  [x] Fluid Immersion  [] Bariatric  [] Total Pressure Relief  [] Other:     Discharge Plan:  Placement for patient upon discharge: skilled nursing, current resident at [address] [place] [address] Hwy: N/A   Patient appropriate for Outpatient [address]: N/A, sees Wound Treatment NP at [facility]    Patient/Caregiver Teaching:  Level of patient/caregiver understanding able to:  Wife   [x] Indicates understanding       [x] Needs reinforcement  [] Unsuccessful      [x] Verbal Understanding  [] Demonstrated understanding       [] No evidence of learning  [] Refused teaching         [] N/A    Will continue to follow patient:      Electronically signed by Fiorella Glass RN, on 6/7/2019 at 7:14 PM

## 2019-06-07 NOTE — PROGRESS NOTES
Intake/Output Summary (Last 24 hours) at 6/7/2019 1446  Last data filed at 6/7/2019 1100  Gross per 24 hour   Intake 3032 ml   Output 3190 ml   Net -158 ml       EXAM:  General: No distress. Alert. Awake and well-oriented   Tracheostomy - >   Ventilator  Obese  NG tube in place  Eyes: PERRL. No sclera icterus. No conjunctiva injected. ENT: No discharge. Pharynx clear. Neck:   Tracheostomy  +  Resp: No accessory muscle use. No crackles. No wheezing. No rhonchi. No dullness on percussion. CV:  Irregular rate and rhythm. No mumur or rub. + edema. No JVD. Palpable pedal pulses. GI: Non-tender. Non-distended. No masses. No organmegaly. Normal bowel sounds. No hernia. Skin: Warm and dry. No nodule on exposed extremities. No rash on exposed extremities. Lymph: No cervical LAD. No supraclavicular LAD. M/S: No cyanosis. No joint deformity. No clubbing. Neuro: Awake. Follows commands. Positive pupils/gag/corneals. Normal pain response. Psych: Oriented to person, place, time. No anxiety or agitation.      Medications:  Scheduled Meds:   [START ON 6/8/2019] hydrocortisone sodium succinate PF  50 mg Intravenous Q24H    warfarin  3 mg Oral Once    darbepoetin daryn-polysorbate  25 mcg Subcutaneous Weekly - Monday    collagenase   Topical Daily    lidocaine 1 % injection  5 mL Intradermal Once    mupirocin   Nasal BID    warfarin (COUMADIN) daily dosing (placeholder)   Other RX Placeholder    cefepime  2 g Intravenous Q12H    sodium chloride  250 mL Intravenous Once    sodium chloride flush  10 mL Intravenous 2 times per day    albuterol sulfate HFA  4 puff Inhalation Q4H    ipratropium  4 puff Inhalation Q4H    chlorhexidine  15 mL Mouth/Throat BID    insulin lispro  0-6 Units Subcutaneous 6 times per day       PRN Meds:  phenol, potassium chloride, magnesium sulfate, calcium gluconate IVPB **OR** calcium gluconate IVPB **OR** calcium gluconate IVPB **OR** calcium gluconate IVPB, sodium phosphate

## 2019-06-07 NOTE — PROGRESS NOTES
High risk vesicant drug infusing: Yes    Multiple incompatible medications infusing: Yes    CVP Monitoring:  Yes    Extremely difficult IV access challenge:  Yes    Continued need for central line access:   Yes    Addressed with physician:  Yes    RIGHT PATIENT, RIGHT TIME, RIGHT LINE

## 2019-06-08 NOTE — PROGRESS NOTES
%  FiO2   Av %  Min: 30 %  Max: 30 %  Patient Vitals for the past 4 hrs:   BP Pulse Resp SpO2 Weight   19 1600 103/78 117 20 99 % --   19 1511 -- -- -- -- (!) 379 lb (171.9 kg)   19 1500 95/68 114 22 99 % --   19 1400 (!) 90/53 114 20 99 % --   19 1300 (!) 88/52 110 20 98 % --       CVP:        Intake/Output Summary (Last 24 hours) at 2019 1626  Last data filed at 2019 1500  Gross per 24 hour   Intake 2909 ml   Output 3366 ml   Net -457 ml       EXAM:  General: No distress. Alert. Awake and well-oriented   Tracheostomy - >   Ventilator  Obese  NG tube in place  Eyes: PERRL. No sclera icterus. No conjunctiva injected. ENT: No discharge. Pharynx clear. Neck:   Tracheostomy  +  Resp: No accessory muscle use. Mild wheezes present . No crackles. No rhonchi. No dullness on percussion. CV:  Irregular rate and rhythm. No mumur or rub. + edema. No JVD. Palpable pedal pulses. GI: Non-tender. Non-distended. No masses. No organmegaly. Normal bowel sounds. No hernia. Skin: Warm and dry. No nodule on exposed extremities. No rash on exposed extremities. + Decubitus ulcers , see nursing/wound care notes   Lymph: No cervical LAD. No supraclavicular LAD. M/S: No cyanosis. No joint deformity. No clubbing. Neuro: Awake. Follows commands. Positive pupils/gag/corneals. Normal pain response. Psych: Oriented to person, place, time. No anxiety or agitation.      Medications:  Scheduled Meds:   warfarin  2 mg Oral Once    hydrocortisone sodium succinate PF  50 mg Intravenous Q24H    darbepoetin daryn-polysorbate  25 mcg Subcutaneous Weekly - Monday    collagenase   Topical Daily    lidocaine 1 % injection  5 mL Intradermal Once    mupirocin   Nasal BID    warfarin (COUMADIN) daily dosing (placeholder)   Other RX Placeholder    cefepime  2 g Intravenous Q12H    sodium chloride flush  10 mL Intravenous 2 times per day    albuterol sulfate HFA  4 puff Inhalation Q4H   

## 2019-06-09 NOTE — PROGRESS NOTES
Bedside report to TRISTON Holt RN and patient care transferred in stable condition.    Electronically signed by Sobeida Greenberg RN on 6/9/2019 at 7:43 AM

## 2019-06-09 NOTE — PROGRESS NOTES
Intake/Output Summary (Last 24 hours) at 6/9/2019 1728  Last data filed at 6/9/2019 1400  Gross per 24 hour   Intake 2845 ml   Output 2037 ml   Net 808 ml       EXAM:  General: No distress. Alert. Awake and well-oriented   Tracheostomy - >   Ventilator  Obese  NG tube in place  Eyes: PERRL. No sclera icterus. No conjunctiva injected. ENT: No discharge. Pharynx clear. Neck:   Tracheostomy  +  Resp: No accessory muscle use. No wheezes. No crackles. No rhonchi. No dullness on percussion. CV:  Irregular rate and rhythm. No mumur or rub. + edema. No JVD. Palpable pedal pulses. GI: Non-tender. Non-distended. No masses. No organmegaly. Normal bowel sounds. No hernia. Skin: Warm and dry. No nodule on exposed extremities. No rash on exposed extremities. + Decubitus ulcers , see nursing/wound care notes   Lymph: No cervical LAD. No supraclavicular LAD. M/S: No cyanosis. No joint deformity. No clubbing. Neuro: Awake. Follows commands. Positive pupils/gag/corneals. Normal pain response. Psych: Oriented to person, place, time. No anxiety or agitation.      Medications:  Scheduled Meds:   warfarin  2 mg Oral Once    darbepoetin daryn-polysorbate  25 mcg Subcutaneous Weekly - Monday    collagenase   Topical Daily    lidocaine 1 % injection  5 mL Intradermal Once    warfarin (COUMADIN) daily dosing (placeholder)   Other RX Placeholder    cefepime  2 g Intravenous Q12H    sodium chloride flush  10 mL Intravenous 2 times per day    albuterol sulfate HFA  4 puff Inhalation Q4H    ipratropium  4 puff Inhalation Q4H    chlorhexidine  15 mL Mouth/Throat BID    insulin lispro  0-6 Units Subcutaneous 6 times per day       PRN Meds:  phenol, LORazepam, sodium chloride flush, glucose, dextrose, glucagon (rDNA), dextrose, fentanNYL, midazolam    Results:  CBC:   Recent Labs     06/07/19  0820 06/08/19  0630 06/09/19  0535   WBC 8.8 11.3* 14.7*   HGB 9.5* 8.8* 9.0*   HCT 32.2* 30.1* 30.6*   MCV 89.7 89.2 90.7

## 2019-06-09 NOTE — PROGRESS NOTES
TF stopped. TF to be restarted at 2045.     Electronically signed by Lary Mendez RN on 6/9/2019 at 4:41 PM

## 2019-06-09 NOTE — FLOWSHEET NOTE
06/09/19 0034   Vitals   Pulse 112   Resp 16   BP (!) 79/59   MAP (mmHg) 65   Increased levophed to 3 mcg/min for low BP.    Electronically signed by Denys Hansen RN on 6/9/2019 at 1:31 AM

## 2019-06-09 NOTE — PROGRESS NOTES
CHF  · ESRD on HD  · CAD with RAMANA to LAD  · Elevated troponin     PLAN:  · PSV daily as tolerated. May try CATC today  · Mechanical ventilation as per my orders. The ventilator was adjusted by me at the bedside for unstable, life threatening respiratory failure. · IV Propofol for sedation, target RASS -2, with daily spontaneous awakening trial.  Fentanyl PRN pain, gtt as needed  · Head of bed 30 degrees or higher at all times  · Daily spontaneous breathing trial once PEEP less than 8, FiO2 less than 55%. · Broad spectrum antibiotics to include Cefepime Day#7; stopped Vanc after 4 days  · IV vasopressors to keep MAP > 64 mmHg: Levophed   · S/p FFP received in ED and 10mg IV Vit K  · Had a normal TTE in 12/18  · tolerating TF  · Replace Kphos  · CRRT per Nephro   · Blood sugar control, with goal 140-180  · Coumadin  · PPI  · Due to at least single organ failure and risk of rapid deterioration, I spent 32 minutes of Critical care time reviewing labs/films, examining patient, collaborating with other physicians. This does not include time performing critical procedures.

## 2019-06-09 NOTE — PROGRESS NOTES
Patient reassessed. Pretty well unchanged. LEVOPHED infusing at a rate of 10 mcg/min. CRRT running. Patient continues to refuse repositioning right now despite RN's education.        Electronically signed by Connie Romero RN on 6/9/2019 at 11:47 AM

## 2019-06-10 NOTE — PROGRESS NOTES
RLE wound dressing changed. More erythema and drainage noted from site. Dr. Valarie Golden notified to assess tomorrow d/t increased WBC and changing antibiotics today.      Electronically signed by Onur Jensen RN on 6/10/2019 at 6:20 PM

## 2019-06-10 NOTE — PROGRESS NOTES
Internal Medicine ICU Progress Note      Interval history:   61year old white male with history of CAD, COPD, ESRD on HD for the last four months, chronic tracheostomy for five months - has been at [facility], recently discharged to [facility], one week ago. Admitted now with septic shock, pneumonia   He was weaning off vent and was on trach collar for up to 12 hours a day, in the nursing home. Now back on vent support. Started on CRRT for renal failure. Required pressors vasopressin and levophed    He is awake and alert, responding appropriately. Mentation improved. Awake and alert. Patient is clearly indicated that he wants his code status to be DNR CCA. Updated on chart. He has continued to require pressors. Weaned off vasopressin. Remains on low-dose Levophed. Has been continued on CRRT with fluid removal . CRRT stopped  due to hypotention with fluid removal      6/10  Patient is stable today. Awake and alert . weaned off vent support during day . on trach collar since this AM 6/10. PMV +. Communicating. No distress   Oxygen saturation stable  Still requiring pressors, on Levophed. Afebrile. Invasive Lines: CVC  placed on 19        MV:  On ventilator. Has tracheostomy    No results for input(s): PHART, CJR0XVD, PO2ART in the last 72 hours.     MV Settings:  Vent Mode: AC/VC Rate Set: 18 bmp/Vt Ordered: 500 mL/ /FiO2 : 30 %    IV:   norepinephrine 10 mcg/min (06/10/19 1436)    dextrose         Vitals:  Temp  Av.7 °F (37.1 °C)  Min: 98.4 °F (36.9 °C)  Max: 99 °F (37.2 °C)  Pulse  Av.4  Min: 103  Max: 128  BP  Min: 63/49  Max: 114/51  SpO2  Av %  Min: 94 %  Max: 100 %  FiO2   Av %  Min: 30 %  Max: 30 %  Patient Vitals for the past 4 hrs:   BP Temp Temp src Pulse Resp SpO2   06/10/19 1500 97/63 -- -- 103 20 97 %   06/10/19 1448 -- 99 °F (37.2 °C) Oral 107 21 98 %   06/10/19 1435 (!) 63/49 -- -- 106 21 96 %   06/10/19 1400 (!) 80/59 -- -- 114 18 97 %   06/10/19 1335 97/60 -- -- 111 16 --   06/10/19 1300 (!) 114/51 -- -- 122 19 96 %       CVP:        Intake/Output Summary (Last 24 hours) at 6/10/2019 1614  Last data filed at 6/10/2019 1522  Gross per 24 hour   Intake 1981 ml   Output --   Net 1981 ml       EXAM:  General: No distress. Alert. Awake and well-oriented   Tracheostomy+ . On trach collar. Stats stable   Obese  NG tube in place   patient communicating well today  Eyes: PERRL. No sclera icterus. No conjunctiva injected. ENT: No discharge. Pharynx clear. Neck:   Tracheostomy  +  Resp: No accessory muscle use. No wheezes. No crackles. No rhonchi. No dullness on percussion. CV:  Irregular rate and rhythm. No mumur or rub. + edema. No JVD. Palpable pedal pulses. GI:  Obese, Non-tender. Non-distended. No masses. No organmegaly. Normal bowel sounds. No hernia. Skin: Warm and dry. No nodule on exposed extremities. No rash on exposed extremities. + Decubitus ulcers , see nursing/wound care notes   Lymph: No cervical LAD. No supraclavicular LAD. M/S: No cyanosis. No joint deformity. No clubbing. Neuro: Awake. Follows commands. Positive pupils/gag/corneals. Normal pain response. Psych: Oriented to person, place, time. No anxiety or agitation.      Medications:  Scheduled Meds:   famotidine (PEPCID) injection  20 mg Intravenous Daily    midodrine  10 mg Oral TID WC    hydrocortisone sodium succinate PF  50 mg Intravenous Q6H    meropenem  500 mg Intravenous Q24H    vancomycin (VANCOCIN) intermittent dosing (placeholder)   Other RX Placeholder    warfarin  3 mg Oral Once    darbepoetin daryn-polysorbate  25 mcg Subcutaneous Weekly - Monday    collagenase   Topical Daily    lidocaine 1 % injection  5 mL Intradermal Once    warfarin (COUMADIN) daily dosing (placeholder)   Other RX Placeholder    sodium chloride flush  10 mL Intravenous 2 times per day    albuterol sulfate HFA  4 puff Inhalation Q4H    ipratropium  4 puff Inhalation Q4H   

## 2019-06-10 NOTE — PROGRESS NOTES
Topical Daily    lidocaine 1 % injection  5 mL Intradermal Once    warfarin (COUMADIN) daily dosing (placeholder)   Other RX Placeholder    cefepime  2 g Intravenous Q12H    sodium chloride flush  10 mL Intravenous 2 times per day    albuterol sulfate HFA  4 puff Inhalation Q4H    ipratropium  4 puff Inhalation Q4H    chlorhexidine  15 mL Mouth/Throat BID    insulin lispro  0-6 Units Subcutaneous 6 times per day     PRN Meds:  phenol, LORazepam, sodium chloride flush, glucose, dextrose, glucagon (rDNA), dextrose, fentanNYL, midazolam    Results:  CBC:   Recent Labs     06/08/19  0630 06/09/19  0535 06/10/19  0415   WBC 11.3* 14.7* 15.4*   HGB 8.8* 9.0* 9.7*   HCT 30.1* 30.6* 33.6*   MCV 89.2 90.7 90.8   * 130* 177     BMP:   Recent Labs     06/09/19  0005 06/09/19  0750 06/10/19  0415    135* 132*   K 3.7 3.6 4.1    99 97*   CO2 27 25 24   PHOS 1.8* 2.0* 2.9   BUN 21* 23* 37*   CREATININE 1.0 1.1 1.9*     LIVER PROFILE:   Recent Labs     06/07/19  0820   AST 30   ALT 30   BILITOT 0.8   ALKPHOS 275*       Cultures:  6/3/19 blood no growth  6/3/19 respiratory Pseudomonas and Staphylococcus aureus  Pseudomonas aeruginosa (2)     Antibiotic Interpretation NALDO Status    cefepime Sensitive <=2 mcg/mL     ciprofloxacin Sensitive <=1 mcg/mL     gentamicin Sensitive <=4 mcg/mL     meropenem Sensitive <=1 mcg/mL     piperacillin-tazobactam Sensitive <=16 mcg/mL     tobramycin Sensitive <=4 mcg/mL     Staphylococcus aureus (3)     Antibiotic Interpretation NALDO Status    benzylpenicillin Resistant >=0.5 mcg/mL     ciprofloxacin Sensitive <=0.5 mcg/mL     clindamycin Sensitive <=0.25 mcg/mL     erythromycin Sensitive <=0.25 mcg/mL     oxacillin Sensitive <=0.25 mcg/mL     tetracycline Sensitive <=1 mcg/mL     trimethoprim-sulfamethoxazole Sensitive <=10 mcg/mL       Films:  CXR was reviewed by me and it showed trach, LLL collapse    ASSESSMENT:  · Septic shock  · Leukocytosis worsening    · Left lower

## 2019-06-10 NOTE — PROGRESS NOTES
Patient updates given to Farrell, [facility]. Patient medicated overnight per PRN orders for anxiety and pain. Levophed infusing at 8mcg/min. Care transferred.

## 2019-06-10 NOTE — PROGRESS NOTES
Patient care assumed, assessment completed as charted. Tracheostomy in place, patient continues on ventilator. Levophed infusing at 5mcg./min through intact right IJ CVC. Patient C/O anxiety, Versed 2mg administered per PRN order. Will monitor.

## 2019-06-10 NOTE — PROGRESS NOTES
06/10/19 1845   Vent Information   $Ventilation $Subsequent Day   Vent Type 840   Vent Mode AC/VC   Vt Ordered 500 mL   Rate Set 18 bmp   Peak Flow 60 L/min   Pressure Support 0 cmH20   FiO2  30 %   Sensitivity 2   PEEP/CPAP 5   Cuff Pressure (cm H2O) 35 cm H2O   Humidification Source Heated wire   Humidification Temp 36.7   Circuit Condensation Drained   Vent Patient Data   Peak Inspiratory Pressure 25 cmH2O   Mean Airway Pressure 9.2 cmH20   Rate Measured 21 br/min   Vt Exhaled 500 mL   Minute Volume 10.5 Liters   I:E Ratio 1:2.30   Plateau Pressure 16 [id number]   Static Compliance 45 mL/cmH2O   Dynamic Compliance 25 mL/cmH2O   Cough/Sputum   Sputum How Obtained Tracheal;Suctioned   Cough Productive   Sputum Amount Moderate   Sputum Color Creamy   Tenacity Thick   Spontaneous Breathing Trial (SBT) RT Doc   Pulse 109   SpO2 95 %   Breath Sounds   Right Upper Lobe Diminished   Right Middle Lobe Diminished   Right Lower Lobe Diminished   Left Upper Lobe Diminished   Left Lower Lobe Diminished   Additional Respiratory  Assessments   Resp 21   Position Semi-Gtz's   Alarm Settings   High Pressure Alarm 45 cmH2O   Low Minute Volume Alarm 3 L/min   Apnea (secs) 20 secs   High Respiratory Rate 45 br/min   Patient Observation   Observations #6 XLT PROX EVA. AMBU BAG AT HEAD OF BED. WATER BAG GOOD.

## 2019-06-10 NOTE — PROGRESS NOTES
Patient reassessed. Unchanged. LEVOPHED infusing at a rate of 10 mcg/min. Has not tolerated weaning of LEVO today thus far.     Electronically signed by Sera Cabrera RN on 6/10/2019 at 3:49 PM

## 2019-06-10 NOTE — PROGRESS NOTES
Pharmacy Note  Vancomycin Consult    Alejandra Garduno is a 61 y.o. male started on Vancomycin for PNA; consult received from Dr. Teresita Stubbs to manage therapy. Also receiving the following antibiotics: Merrem. Patient Active Problem List   Diagnosis    Chronic atrial fibrillation ([facility])    Morbid obesity with BMI of 60.0-69.9, adult ([facility])    COPD (chronic obstructive pulmonary disease) ([facility])    DMII (diabetes mellitus, type 2) ([facility])    SOB (shortness of breath)    ELODIA (obstructive sleep apnea)    COPD exacerbation ([facility])    ELODIA treated with BiPAP    NSTEMI (non-ST elevated myocardial infarction) ([facility] [address]    Essential hypertension    Chronic diastolic heart failure ([facility] [address]    Coronary artery disease involving native coronary artery of native heart without angina pectoris    Septic shock ([facility])    Supratherapeutic INR    Lactic acidosis    Aspiration pneumonia ([facility] [address]    Acute respiratory failure with hypoxia ([facility])       Allergies:  Patient has no known allergies. Temp max:     Recent Labs     06/09/19  0750 06/10/19  0415   BUN 23* 37*       Recent Labs     06/09/19  0750 06/10/19  0415   CREATININE 1.1 1.9*       Recent Labs     06/09/19  0535 06/10/19  0415   WBC 14.7* 15.4*         Intake/Output Summary (Last 24 hours) at 6/10/2019 1307  Last data filed at 6/10/2019 1117  Gross per 24 hour   Intake 1458 ml   Output 4 ml   Net 1454 ml       Culture Date      Source                       Results  Resp- Staph and Pseudomonas    Ht Readings from Last 1 Encounters:   06/07/19 5' 9\" (1.753 m)        Wt Readings from Last 1 Encounters:   06/10/19 (!) 391 lb 1.6 oz (177.4 kg)         Body mass index is 57.76 kg/m². Estimated Creatinine Clearance: 67 mL/min (A) (based on SCr of 1.9 mg/dL (H)). Assessment/Plan:  Will initiate vancomycin 1500mg IV times one, then follow sliding scale with HD. Levels ordered prior to HD on Tu,Th, and Sat.     If pre-HD level is > 20 mcg/mL, hold x1 dose Vancomycin  If pre-HD level is 10-20 mcg/mL, give 750mg Vancomycin after HD  If pre-HD level is < 10 mcg/mL, give 1250mg Vancomycin after HD     Thank you for the consult. Will continue to follow.   \  Farrah Varner Pharm D 0/02/64098:55 PM  .

## 2019-06-10 NOTE — PROGRESS NOTES
Nephrology Progress Note   [email or web address]      This patient is a 61year old male whom we are following for ESRD. Subjective:  CRRT was stopped on 6/9 d/t worsening pressors requirement and not able to remove fluid  He remains on 9 mcg of levo  Leukocytosis persists    Family History: No Family at bedside   ROS: + edema. No abdominal pain    Scheduled Meds:   darbepoetin daryn-polysorbate  25 mcg Subcutaneous Weekly - Monday    collagenase   Topical Daily    lidocaine 1 % injection  5 mL Intradermal Once    warfarin (COUMADIN) daily dosing (placeholder)   Other RX Placeholder    cefepime  2 g Intravenous Q12H    sodium chloride flush  10 mL Intravenous 2 times per day    albuterol sulfate HFA  4 puff Inhalation Q4H    ipratropium  4 puff Inhalation Q4H    chlorhexidine  15 mL Mouth/Throat BID    insulin lispro  0-6 Units Subcutaneous 6 times per day     Continuous Infusions:   heparin 5000 units CRRT syringe 2 mL/hr at 06/09/19 0414    norepinephrine 10 mcg/min (06/10/19 0813)    dextrose       PRN Meds:.phenol, LORazepam, sodium chloride flush, glucose, dextrose, glucagon (rDNA), dextrose, fentanNYL, midazolam    Vitals:  BP 88/60   Pulse 118   Temp 99 °F (37.2 °C) (Temporal)   Resp 21   Ht 5' 9\" (1.753 m)   Wt (!) 391 lb 1.6 oz (177.4 kg)   SpO2 99%   BMI 57.76 kg/m²   I/O last 3 completed shifts: In: 1983 [I.V.:934; NG/GT:1049]  Out: 378   No intake/output data recorded. Physical Exam:  Physical Exam   Constitutional: No distress. HENT:   Head: Normocephalic and atraumatic. Eyes: Conjunctivae are normal. No scleral icterus. Neck:   Trache in place   Cardiovascular: Normal rate. Exam reveals no gallop and no friction rub. Pulmonary/Chest:   Equal chest expansion, coarse breath sounds bilateral   Abdominal: Soft. Bowel sounds are normal. He exhibits no distension. There is no tenderness. Musculoskeletal: He exhibits edema. Skin: Skin is warm. Vitals reviewed.     Access: [facility], LUE AVF      Medications:   darbepoetin daryn-polysorbate  25 mcg Subcutaneous Weekly - Monday    collagenase   Topical Daily    lidocaine 1 % injection  5 mL Intradermal Once    warfarin (COUMADIN) daily dosing (placeholder)   Other RX Placeholder    cefepime  2 g Intravenous Q12H    sodium chloride flush  10 mL Intravenous 2 times per day    albuterol sulfate HFA  4 puff Inhalation Q4H    ipratropium  4 puff Inhalation Q4H    chlorhexidine  15 mL Mouth/Throat BID    insulin lispro  0-6 Units Subcutaneous 6 times per day         Labs:  Recent Labs     06/08/19  0630 06/09/19  0535 06/10/19  0415   WBC 11.3* 14.7* 15.4*   HGB 8.8* 9.0* 9.7*   HCT 30.1* 30.6* 33.6*   MCV 89.2 90.7 90.8   * 130* 177     Recent Labs     06/08/19  1420 06/09/19  0005 06/09/19  0750 06/10/19  0415   * 136 135* 132*   K 4.3 3.7 3.6 4.1   CL 99 101 99 97*   CO2 25 27 25 24   GLUCOSE 134* 110* 137* 116*   PHOS 2.3* 1.8* 2.0* 2.9   MG 2.30 2.20 2.20  --    BUN 21* 21* 23* 37*   CREATININE 1.0 1.0 1.1 1.9*   LABGLOM >60 >60 >60 36*   GFRAA >60 >60 >60 44*           Assessment/Plan:    ESRD.  - On HD TThS via a [facility] outpatient. Has an immature LUE AVF. - CRRT 6/4-9/19.   -possible iHD vs CRRT based on clinical status/labs  -no RRT on 6/10    Septic Shock.  - From aspiration pneumonia/pseudomonas   - Antibiotics per primary service. Acute Hypoxic Respiratory Failure with Aspiration Pneumonia. - Plans per pulmonary. Hypervolemic Hyponatremia. - not able to maximize UF since 6/9      Anemia.  - Hgb stable. - Continue Aranesp 25mcg SC qweekly. - Will monitor closely. Please do not hesitate to contact me at [phone] if with questions. Thank you!     Norma Bynum MD  6/10/2019  [facility]

## 2019-06-10 NOTE — PROGRESS NOTES
RN tracheal suctioned patient. Large amount of thick creamy sputum obtained.      Electronically signed by Chidi Dallas RN on 6/10/2019 at 4:30 PM

## 2019-06-11 NOTE — CARE COORDINATION
INTERDISCIPLINARY PLAN OF CARE CONFERENCE    Date/Time: 6/11/2019 11:25 AM  Completed by: Isaac Patrick, Case Management      Patient Name:  Shyann Chávez  YOB: 1960  Admitting Diagnosis: Shock [facility]) [R57.9]     Admit Date/Time:  6/3/2019  5:16 AM    Chart reviewed. Interdisciplinary team met to discuss patient progress and discharge plans. Disciplines included Case Management, Nursing, and Dietitian. Current Status: Inpt status    PT/OT recommendation:n/a    Anticipated Discharge Date: TBD  Expected D/C Disposition:  ?  Confirmed plan with patient and/or family Yes  Discharge Plan Comments: Pt from [facility]. Considering hospice options. PC active with Pt/family. CM Will follow.        Home O2 in place on admit: Yes  Pt informed of need to bring portable home O2 tank on day of discharge for nursing to connect prior to leaving:  Not Indicated  Verbalized agreement/Understanding:  Not Indicated

## 2019-06-11 NOTE — PROGRESS NOTES
Removed from the ventilator and placed on 40% catc with speaking valve in place  Deflated cuff  Will monitor

## 2019-06-11 NOTE — PROGRESS NOTES
07:10 Bedside report received , pt stable chronic trach w/ vent support   08:13 RT in room  Pt removed from the ventilator and placed on 40% catc with speaking valve in place  deflated cuff    08:30 Pt has chronic trach w/ trach collar in place , assessment as charted VSS , pt is refusing to be turned , pt pulled small bore feeding tube out last night and is refusing to allow nursing supervisor to place new small bore feeding tube , per AM report MD ok with pt having few ice chips, midodrine crushed and gave with very small amount of ice chips, which pt tolerated well   10:00 Bedside rounding complete w/ Dr. Teresita Stubbs. Dr. Teresita Stubbs spoke in great detail to patient and wife. Pt and wife would like a Hospice Consult to discuss their options , Dr. Teresita Stubbs is aware pt is refusing new small bore tube feeding line . MD wants Speech Therapy to see pt, wound care nurse also called per Dr. Teresita Stubbs request to see pt RLE , no answer message left for wound care nurse  12:48 Speech Therapy at bedside per therapist she does not recommned pt to have anything PO , therapist recommends pt to have a full work up , per Speech Therapy   \" Patient is recommended for full Speech/swallow evaluation and FEES or other instrumental assessment prior to initiating PO intake\". Hospitalist made aware for Speech Therapy recommendations , HD nurse in room for tx  16:00 Dr. Alejandrina Regalado at bedside and updated on pt status and speech recommendations , MD said to hold PO medications for now and pt to having FEES testing tomorrow .    16:36 HD tx completed, pt tolerated well, assessment as charted  17:20 25 mcg IV Fentanyl given   18:00 Pt incont of stool , small amount of stool in wound, dressing changed per orders  19:10 Bedside report given to night nurse Ari Maravilla, pt stable @ handoff trach w/ vent

## 2019-06-11 NOTE — PROGRESS NOTES
Reassessment complete. Pt condition remains unchanged. LEVOPHED infusing at a rate of 6 mcg/min. Tolerating weaning of levophed at this time. MAP remains greater then 65 per order.  Electronically signed by Boo Camara RN on 6/11/2019 at 12:54 AM

## 2019-06-11 NOTE — PROGRESS NOTES
06/11/19 1848   Vent Information   $Ventilation $Subsequent Day   Vent Type 840   Vent Mode AC/VC   Vt Ordered 500 mL   Rate Set 18 bmp   Peak Flow 60 L/min   Pressure Support 0 cmH20   FiO2  30 %   Sensitivity 2   PEEP/CPAP 5   Cuff Pressure (cm H2O) 35 cm H2O   Humidification Source Heated wire   Humidification Temp 36.8   Humidification Temp Measured 36.8   Circuit Condensation Drained   Vent Patient Data   Peak Inspiratory Pressure 29 cmH2O   Mean Airway Pressure 12 cmH20   Rate Measured 22 br/min   Vt Exhaled 522 mL   Minute Volume 13.3 Liters   I:E Ratio 1:2.30   Plateau Pressure 18 [id number]   Static Compliance 40 mL/cmH2O   Dynamic Compliance 22 mL/cmH2O   Cough/Sputum   Sputum How Obtained Tracheal;Suctioned   Cough Productive   Sputum Amount Moderate   Sputum Color Creamy; Red   Tenacity Thick   Spontaneous Breathing Trial (SBT) RT Doc   Pulse 112   Breath Sounds   Right Upper Lobe Diminished   Right Middle Lobe Diminished   Right Lower Lobe Diminished   Left Upper Lobe Diminished   Left Lower Lobe Diminished   Additional Respiratory  Assessments   Resp 22   End Tidal CO2 98 (%)   Position Semi-Gtz's   Alarm Settings   High Pressure Alarm 45 cmH2O   Low Minute Volume Alarm 3 L/min   Apnea (secs) 20 secs   High Respiratory Rate 45 br/min   Patient Observation   Observations #6XLT DAYDAY VELASQUEZ. AMBU BAG AT HEAD OF BED. WATER BAG GOOD.

## 2019-06-11 NOTE — PROGRESS NOTES
Diminished   Right Middle Lobe Diminished   Right Lower Lobe Diminished   Left Upper Lobe Diminished   Left Lower Lobe Diminished   Additional Respiratory  Assessments   Resp 18   Position Semi-Gtz's   Alarm Settings   High Pressure Alarm 45 cmH2O   Low Minute Volume Alarm 3 L/min   Apnea (secs) 20 secs   High Respiratory Rate 45 br/min   Patient Observation   Observations #6 XLT DAYDAY VELASQUEZ. AMBU BAG AT HEAD OF BED. WATER BAG GOOD.         06/10/19 2234   Vent Information   Vent Type 840   Vent Mode AC/VC   Vt Ordered 500 mL   Rate Set 18 bmp   Peak Flow 60 L/min   Pressure Support 0 cmH20   FiO2  30 %   Sensitivity 2   PEEP/CPAP 5   Humidification Source Heated wire   Humidification Temp 37   Humidification Temp Measured 37   Circuit Condensation Drained   Vent Patient Data   Peak Inspiratory Pressure 37 cmH2O   Mean Airway Pressure 11 cmH20   Rate Measured 18 br/min   Vt Exhaled 479 mL   Minute Volume 9.55 Liters   I:E Ratio 1:2.70   Cough/Sputum   Sputum How Obtained Tracheal;Suctioned   Cough Productive   Sputum Amount Moderate   Sputum Color Creamy   Tenacity Thick   Spontaneous Breathing Trial (SBT) RT Doc   Pulse 107   SpO2 96 %   Breath Sounds   Right Upper Lobe Diminished   Right Middle Lobe Diminished   Right Lower Lobe Diminished   Left Upper Lobe Diminished   Left Lower Lobe Diminished   Additional Respiratory  Assessments   Resp 18   Position Semi-Gtz's   Alarm Settings   High Pressure Alarm 45 cmH2O   Low Minute Volume Alarm 3 L/min   Apnea (secs) 20 secs   High Respiratory Rate 45 br/min   Patient Observation   Observations #6 XLT DAYDAY VELASQUEZ. AMBU BAG AT HEAD OF BED. WATER BAG GOOD.

## 2019-06-11 NOTE — PROGRESS NOTES
Pt pulled out dop cecilia. Perfect Serve sent to Dr. Steven Rogers. Said to leave out until morning.

## 2019-06-12 NOTE — PROGRESS NOTES
Internal Medicine ICU Progress Note      Interval history:   61year old white male with history of CAD, COPD, ESRD on HD for the last four months, chronic tracheostomy for five months - has been at [facility], recently discharged to [facility], one week ago. Admitted now with septic shock, pneumonia   He was weaning off vent and was on trach collar for up to 12 hours a day, in the nursing home. Now back on vent support. Started on CRRT for renal failure. Required pressors vasopressin and levophed    He is awake and alert, responding appropriately. Mentation improved. Awake and alert. Patient is clearly indicated that he wants his code status to be DNR CCA. Updated on chart. He has continued to require pressors. Weaned off vasopressin. Remains on low-dose Levophed. Has been continued on CRRT with fluid removal . CRRT stopped  due to hypotention with fluid removal. Tolerated HD . Weaned off vent support during the day  Since 6/10  Off levo   - off levophed this AM   Patient is stable today. Awake and alert . Weaned off vent support during day . on trach collar since this AM 6/10. PMV +. Communicating. No distress   Oxygen saturation stable    Afebrile. Patient and family interested in discussing with hospice - hospice consulted    Invasive Lines: CVC  placed on 19        MV:  On ventilator qhs . Has tracheostomy    No results for input(s): PHART, JQA8DGC, PO2ART in the last 72 hours.     MV Settings:  Vent Mode: PS Rate Set: 18 bmp/Vt Ordered: 500 mL/ /FiO2 : 30 %    IV:   norepinephrine Stopped (19 0800)    dextrose         Vitals:  Temp  Av.5 °F (36.9 °C)  Min: 98.1 °F (36.7 °C)  Max: 98.8 °F (37.1 °C)  Pulse  Av.9  Min: 90  Max: 124  BP  Min: 88/61  Max: 138/80  SpO2  Av.5 %  Min: 79 %  Max: 100 %  FiO2   Av %  Min: 30 %  Max: 30 %  Patient Vitals for the past 4 hrs:   BP Temp Temp src Pulse Resp SpO2   19 1600 103/80 98.1 °F with FFP. Resumed  warfarin.      #  ESRD:  Nephrology consulted. Has been on CRRT - stopped  6/9. HD today    #  Morbid Obesity. Body mass index is 54.34 kg/m². Complicating assessment and treatment. Placing patient at risk for multiple co-morbidities as well as early death and contributing to the patient's presentation.      #  DM type 2 monitor sugars. Sliding scale insulin coverage     # dysphagia   - seen by speechz therapy, had FEES .  general diet now        DVT prophylaxis- on coumadin    DIET GENERAL; No Drinking Straw   DNR-CCA        Dianne Moore 5:27 PM 6/12/2019

## 2019-06-12 NOTE — PROGRESS NOTES
Pulmonary & Critical Care Medicine ICU Progress Note    CC: Acute respiratory failure and shock    Events of Last 24 hours:   I discussed palliative care options with patient. Sagar Frances is actively working with patient and family  Speech therapy saw patient, recommended FEES  Levophed is titrated  On PS this morning, increased work of breathing with trach trial  C/O chest pain to RN    Invasive Lines:  19 right IJ CVC    MV: chronic  Vent Mode: AC/VC Rate Set: 18 bmp/Vt Ordered: 500 mL/ /FiO2 : 30 %  No results for input(s): PHART, [id number], PO2ART in the last 72 hours. IV:   norepinephrine 0.5 mcg/min (19 0540)    dextrose         Vitals:  Blood pressure (!) 95/46, pulse 90, temperature 98.5 °F (36.9 °C), temperature source Axillary, resp. rate 15, height 5' 9\" (1.753 m), weight (!) 358 lb 14.5 oz (162.8 kg), SpO2 100 %. on 30%  Temp  Av.4 °F (36.9 °C)  Min: 97.9 °F (36.6 °C)  Max: 98.6 °F (37 °C)    Intake/Output Summary (Last 24 hours) at 2019 0732  Last data filed at 2019 0503  Gross per 24 hour   Intake 679 ml   Output 4400 ml   Net -3721 ml     EXAM:  General:  ill appearing    Eyes: PERRL. No sclera icterus. No conjunctival injection. ENT: No discharge. Pharynx clear  Neck: Trachea midline with tracheostomy tube present, #6 Shiley. Normal thyroid. Resp: No accessory muscle use. No crackles. No wheezing. No rhonchi. No dullness on percussion. CV: Regular rate. Regular rhythm. No mumur or rub.  + edema. GI: Non-tender. Non-distended. No masses. No organomegaly. Normal bowel sounds. No hernia. Skin: Warm and dry. No nodule on exposed extremities. RLE wound approximately 6X 10 cm, two open areas, erythematous border (marked today)  Lymph: No cervical LAD. No supraclavicular LAD. M/S: No cyanosis. No joint deformity. No clubbing. Neuro: awake and communicative today.  Patellar reflexes are symmetric    Scheduled Meds:   heparin (porcine)  4,500 Units Intravenous Once

## 2019-06-12 NOTE — PROGRESS NOTES
High risk vesicant drug infusing:  ___yes______    Multiple incompatible medications infusing:  ___no______    CVP Monitoring:  ___no______    Extremely difficult IV access challenge:  ___yes_____    Continued need for central line access:  __yes________    Addressed with physician:  ___yes_____    RIGHT PATIENT, RIGHT TIME, RIGHT LINE

## 2019-06-12 NOTE — PROGRESS NOTES
observed at UES. MIXED CONSISTENCY: Premature spillage of liquid portion of mixed consistency to valleculae and x1 to R pyriform, where swallow triggered. Trace residue noted in bilateral pyriforms with x1 instance of deep penetration before the swallow (from pyriform residue) during mastication of soft solid, which cleared with subsequent swallow. *Pt would benefit from avoiding mixed consistencies (I.e. Soups, cereals, etc). Premature spillage of soft solid portion of mixed consistency to valleculae, where swallow triggered. No penetration / aspiration noted with soft solid trials. No significant pharyngeal residue noted post-swallow. No bolus-colored backflow observed at UES. REGULAR SOLIDS: Mildly prolonged A-P bolus transit observed, however, was effective with rotary chew pattern despite edentulous state (pt reported that he does not typically wear dentures when eating). Premature spillage to the valleculae noted, where swallow triggered. No penetration / aspiration noted. No significant pharyngeal residue noted post-swallow. No bolus-colored backflow observed at UES. BACKFLOW OF SECRETIONS: No backflow of secretions or bolus-colored backflow observed at UES.       COMPENSATORY STRATEGIES / POSTURAL CHANGES TRIALED: Small bites / sips, alternate bite / sip, double swallow, upright positioning      PENETRATION-ASPIRATION SCALE (PAS)  [] 8 Material enters the airway, passes below the vocal folds, and no effort is made to eject  [] 7 Material enters the airway, passes below the vocal folds, and is not ejected from the trachea despite effort  [] 6 Material enters the airway, passes below the vocal folds, and is ejected into the larynx or out of the airway  [] 5 Material enters the airway, contacts the vocal folds, and is not ejected into the airway  [] 4 Material enters the airway, contacts the vocal folds, and is ejected from the airway  [] 3 Material enters the airway, remains above the vocal

## 2019-06-12 NOTE — CONSULTS
needed   Yes Historical Provider, MD   calcitonin (MIACALCIN) 200 UNIT/ACT nasal spray 1 spray by Nasal route daily   Yes Historical Provider, MD   midodrine (PROAMATINE) 10 MG tablet Take 10 mg by mouth daily Indications: Give 10mg by mouthone time a day every tues, thursday, saturday for hypotension. Give 1 tab pre and mid HD. Yes Historical Provider, MD   midodrine (PROAMATINE) 10 MG tablet Take 10 mg by mouth 3 times daily   Yes Historical Provider, MD   montelukast (SINGULAIR) 10 MG tablet Take 10 mg by mouth nightly   Yes Historical Provider, MD   ondansetron (ZOFRAN) 4 MG tablet Take 4 mg by mouth every 6 hours as needed for Nausea or Vomiting   Yes Historical Provider, MD   oxyCODONE 5 MG capsule Take 10 mg by mouth every 4 hours as needed for Pain. Yes Historical Provider, MD   scopolamine (TRANSDERM-SCOP) transdermal patch Place 1 patch onto the skin every 72 hours   Yes Historical Provider, MD   senna (SENOKOT) 8.6 MG tablet Take 1 tablet by mouth 2 times daily   Yes Historical Provider, MD   sevelamer (RENVELA) 800 MG tablet Take 1 tablet by mouth 3 times daily (with meals)   Yes Historical Provider, MD   sodium chloride, Inhalant, 3 % nebulizer solution Take 4 mLs by nebulization every 6 hours as needed for Other   Yes Historical Provider, MD   traZODone (DESYREL) 100 MG tablet Take 100 mg by mouth nightly   Yes Historical Provider, MD   warfarin (COUMADIN) 3 MG tablet Take 3 mg by mouth daily   Yes Historical Provider, MD   ipratropium-albuterol (DUONEB) 0.5-2.5 (3) MG/3ML SOLN nebulizer solution Inhale 1 vial into the lungs every 4 hours   Yes Historical Provider, MD   gabapentin (NEURONTIN) 300 MG capsule Take 100 mg by mouth nightly as needed.     Yes Historical Provider, MD   atorvastatin (LIPITOR) 40 MG tablet Take 1 tablet by mouth daily  Patient taking differently: Take 20 mg by mouth daily  8/29/18  Yes Lori Leach MD   albuterol (PROVENTIL HFA;VENTOLIN HFA) 108 (90 BASE) MCG/ACT inhaler

## 2019-06-12 NOTE — PROGRESS NOTES
Patient placed on trach collar at 50%, cuff deflated, patient felt sob and increase wob.  Patient back to ventilator on ps of 5/5, tolerating well at this time, rsbi 34

## 2019-06-12 NOTE — CARE COORDINATION
Palliative Care Note    Palliative Care Admit Date: 06/11/2019    Plan of care/goals:Chart reviewed. Noted plan for family meeting with [facility] today. Spoke with Ruperto Self at [facility], she has contact number for patient's spouse, plans meeting to discuss options of care. PC RN will follow-awaiting outcome of meeting.            Reason for consult     ___Advance Care Planning  __x_Transition of Care Planning  _x__Psychosocial/Spiritual Support  ___Symptom Management  _x__Goals of Care

## 2019-06-12 NOTE — PROGRESS NOTES
safest and least restrictive diet without s/s of aspiration. Dysphagia Goals: The patient will tolerate recommended diet without observed clinical signs of aspiration; The patient/caregiver will demonstrate understanding of compensatory strategies for improved swallowing safety. ;The patient will tolerate instrumental swallowing procedure    General  Chart Reviewed: Yes  Comments: Pt admitted with sepsis, ARF, possible aspiration PNA per chart. Pt with trach placement in 12/2018 per chart. Behavior/Cognition: Alert; Cooperative  Respiratory Status: Ventilator(Pt is on trach collar, cuff deflated with PMV at times)  O2 Device: (Pt is on trach collar, cuff deflated with PMV at times)  Communication Observation: Functional  Follows Directions: Simple  Dentition: Some missing teeth  Patient Positioning: Upright in bed  Baseline Vocal Quality: Aphonic  Volitional Cough: Strong  Prior Dysphagia History: Pt had FEES completed on 12/13/18 at [facility] s/p tracheostomy per chart with silent aspiration observed with both thin and honey-thick liquids; strict NPO diet recommended at that time. Pt was then by ST at [facility] and briefly at [facility] (about ~1 week per chart), however, unable to locate records in EMR / reach SLP at [facility] (voicemail left). Per pt and pt's wife, pt \"eats anything\" at baseline. Consistencies Administered: (No PO trials observed during BSE d/t pt's risk of silenta spiration; FEES recommended prior to initiation of PO diet)    Vision/Hearing  Vision  Vision: Impaired  Vision Exceptions: Wears glasses at all times  Hearing  Hearing: Within functional limits    Oral Motor Deficits  Oral/Motor  Oral Motor: Within functional limits    Oral Phase Dysfunction  Oral Phase  Oral Phase: Exceptions  Oral Phase  Oral Phase - Comment: Needs further assessment via instrumental assessment (FEES). Pt is judged to be at a high risk of silent aspiration with PO intake at this time, no PO trials observed at bedside.   FEES to be

## 2019-06-12 NOTE — PROGRESS NOTES
Patient reassessed. Pretty well unchanged. Hospice in to discuss services with patient and wife today. On vent 5/5 PS. FEES to be completed this am by  Central line dressing is clean, dry and intact with no signs of drainage. All tubing is current and dated. IPA caps applied to all access hubs.      Electronically signed by Solomon Heredia RN on 6/12/2019 at 11:50 AM

## 2019-06-13 NOTE — PROGRESS NOTES
ID note (consult to be dictated):    A: aspiration with COPD exacerbation vs pneumonia in a patient with ESRD and tracheostomy with prn mechanical ventilation. Acinetobacter and Pseudomonas have grown from recent respiratory culture. The patient seems to be doing relatively well from a respiratory standpoint without antibiotic therapy which would be active against the Acinetobacter, suggesting that he may not need that (eg colistin). If his respiratory status deteriorates I would certainly add colistin. The meropenem should be ok for the Pseudomonas. I assume the vancomycin is being given for the possibility that he may have had a hemorrhagic cellulitis involving his R lower leg.

## 2019-06-13 NOTE — PROGRESS NOTES
Pt tolerating 35% CATC well with speaking valve in place         06/13/19 1600   Oxygen Therapy/Pulse Ox   O2 Device Trach mask   FiO2  35 %   Resp 17

## 2019-06-13 NOTE — PROGRESS NOTES
07:20 Bedside report received , pt stable at handoff trach w/ collar  08:30 Pt awake a&o x4, periods of confusion typically @ night or after pain medication, assessment as charted, bed alarm on at all times for safety, tele sitter at bedside as well  10:00 Critical care rounds completed w/ Dr. Valarie Golden  10:30 HD nurse at bedside for treatment   10:30 -15:45 Pt receiving HD   15:50 Pt awake a&o x4, re assessment as charted   19:10 Bedside report given to night shift nurse, pt stable at handoff chronic trach on trach collar

## 2019-06-13 NOTE — PROGRESS NOTES
consume food, Insufficient energy/nutrient consumption, Psychological cause/life stress     Signs and symptoms:  as evidenced by Nutrition support - EN, Presence of wounds, Lab values, Known losses from dialysis    Objective Information:  · Nutrition-Focused Physical Findings: patient looked better this am; + HD during rounds; per RN, patient pulled his central line out overnight; RLE - redness seems better but wound may be necrotic; skin color is james; abdomen is round/rotund, non-tender, and bowel sounds are active; + BM on 6/11 noted  · Wound Type: Pressure Ulcer, Unstageable, Stage II(multiple unstageable/stage 2 pressure wounds on L/R + upper/lower buttocks, thighs, and sacrum)  · Current Nutrition Therapies:  · Oral Diet Orders: General, No Straws   · Oral Diet intake: 51-75%  · Oral Nutrition Supplement (ONS) Orders: None  · ONS intake: NPO  · Anthropometric Measures:  · Ht: 5' 9\" (175.3 cm)   · Current Body Wt: 369 lb 4 oz (167.5 kg)  · Admission Body Wt: 368 lb (166.9 kg)  · Usual Body Wt: (unable to assess)  · Weight Change:  no significant weight changes (using admission weight and CBW)  · Ideal Body Wt: 160 lb (72.6 kg), % Ideal Body 231%  · BMI Classification: BMI > or equal to 40.0 Obese Class III(54.6)    Nutrition Interventions:   Continue current diet  Continued Inpatient Monitoring, Coordination of Care, Coordination of Community Care, Speech Therapy    Nutrition Evaluation:   · Evaluation: Goals set   · Goals: patient will consume > 50% or greater of meals on general diet order (no drinking straw and speaking valve is in place) x 3 meals per day    · Monitoring: Meal Intake, Diet Tolerance, Skin Integrity, Wound Healing, Weight, Pertinent Labs, Chewing/Swallowing, Monitor Bowel Function      Electronically signed by Lesa Melvin RD, LD on 6/13/19 at 11:57 AM    Contact Number: [phone]

## 2019-06-13 NOTE — PLAN OF CARE
Problem: Falls - Risk of:  Goal: Will remain free from falls  Description  Will remain free from falls  6/13/2019 1954 by Musa Dietz RN  Outcome: Ongoing  6/13/2019 1821 by Joseline Bowling RN  Outcome: Ongoing  Goal: Absence of physical injury  Description  Absence of physical injury  6/13/2019 1954 by Musa Dietz RN  Outcome: Ongoing  6/13/2019 1821 by Joseline Bowling RN  Outcome: Ongoing  6/13/2019 0656 by Shayla Walsh RN  Outcome: Ongoing     Problem: Risk for Impaired Skin Integrity  Goal: Tissue integrity - skin and mucous membranes  Description  Structural intactness and normal physiological function of skin and  mucous membranes.   6/13/2019 1954 by Musa Dietz RN  Outcome: Ongoing  6/13/2019 1821 by Joseline Bowling RN  Outcome: Ongoing     Problem: Discharge Planning:  Goal: Participates in care planning  Description  Participates in care planning  6/13/2019 1954 by Musa Dietz RN  Outcome: Ongoing  6/13/2019 1821 by Joseline Bowling RN  Outcome: Ongoing  6/13/2019 0656 by Shayla Walsh RN  Outcome: Ongoing  Goal: Discharged to appropriate level of care  Description  Discharged to appropriate level of care  6/13/2019 1954 by Musa Dietz RN  Outcome: Ongoing  6/13/2019 1821 by Joseline Bowling RN  Outcome: Ongoing     Problem: Airway Clearance - Ineffective:  Goal: Ability to maintain a clear airway will improve  Description  Ability to maintain a clear airway will improve  6/13/2019 1954 by Musa Dietz RN  Outcome: Ongoing  6/13/2019 1821 by Joseline Bowling RN  Outcome: Ongoing  6/13/2019 0656 by Shayla Walsh RN  Outcome: Ongoing  Goal: Clear lung sounds  Description  Clear lung sounds  6/13/2019 1954 by Musa Dietz RN  Outcome: Ongoing  6/13/2019 1821 by Joseline Bowling RN  Outcome: Ongoing     Problem: Anxiety/Stress:  Goal: Level of anxiety will decrease  Description  Level of anxiety will decrease  6/13/2019 1954 by Musa Dietz RN  Outcome: within specified parameters  Description  Achieves intake and output within specified parameters  6/13/2019 1954 by Nithya Dawn RN  Outcome: Ongoing  6/13/2019 1821 by Marisel Doty RN  Outcome: Ongoing     Problem: Hyperthermia:  Goal: Ability to maintain a body temperature in the normal range will improve  Description  Ability to maintain a body temperature in the normal range will improve  6/13/2019 1954 by Nithya Dawn RN  Outcome: Ongoing  6/13/2019 1821 by Marisel Doty RN  Outcome: Ongoing     Problem: Tobacco Use:  Goal: Will participate in inpatient tobacco-use cessation counseling  Description  Will participate in inpatient tobacco-use cessation counseling  6/13/2019 1954 by Nithya Dawn RN  Outcome: Ongoing  6/13/2019 1821 by Marisel Doty RN  Outcome: Ongoing

## 2019-06-13 NOTE — PROGRESS NOTES
during day, full support HS  · Merrem and vancomycin day #4, received 8 days cefepime   · Levophed for MAP of 65; if resumed will require new central access    · IHD per nephrology  · I appreciate help from Cardiology  · Consult infectious disease for MDRO  · Coumadin, goal INR 2-3  · Hydrocortisone  · Midodrine    · Wound care input appreciated  · Speech therapy input appreciated  · F/U CXR today reassuring  · Hospice has seen, currently we are continuing life prolonging measures & life support  · Prophylaxis: On Coumadin, pepcid     Total critical care time caring for this patient with life threatening, unstable organ failure, including direct patient contact, management of life support systems, review of data including imaging and labs, discussions with other team members and physicians is 34 minutes so far today, excluding procedures.

## 2019-06-13 NOTE — PROGRESS NOTES
06/13/19 0335   Vent Information   Vent Type 840   Vent Mode AC/VC   Vt Ordered 500 mL   Rate Set 18 bmp   Peak Flow 60 L/min   Pressure Support 0 cmH20   FiO2  30 %   Sensitivity 2   PEEP/CPAP 5   Humidification Source Heated wire   Humidification Temp 37   Humidification Temp Measured 37   Circuit Condensation Drained   Vent Patient Data   Peak Inspiratory Pressure 27 cmH2O   Mean Airway Pressure 12 cmH20   Rate Measured 25 br/min   Vt Exhaled 537 mL   Minute Volume 12.9 Liters   I:E Ratio 1:1.70   Cough/Sputum   Sputum How Obtained Tracheal;Suctioned   Cough Productive   Sputum Amount Moderate   Sputum Color Creamy; Red   Tenacity Thick   Spontaneous Breathing Trial (SBT) RT Doc   Pulse 112   SpO2 94 %   Breath Sounds   Right Upper Lobe Diminished   Right Middle Lobe Diminished   Right Lower Lobe Diminished   Left Upper Lobe Diminished   Left Lower Lobe Diminished   Additional Respiratory  Assessments   Resp (!) 32   Position Semi-Gtz's   Alarm Settings   High Pressure Alarm 45 cmH2O   Low Minute Volume Alarm 3 L/min   Apnea (secs) 20 secs   High Respiratory Rate 45 br/min   Patient Observation   Observations #6 EVA PETERST PROX. AMBU BAG AT HEAD OF BED. WATER BAG GOOD.

## 2019-06-13 NOTE — PROGRESS NOTES
male up in bed    Awake, alert and oriented. Appears to be not in any distress  Mucous Membranes:  Pink , anicteric  Tracheostomy status  With PMV +   Neck: No JVD, no carotid bruit, no thyromegaly  Chest:  Clear to auscultation bilaterally, diminished in bases   Right Tunnled HD   Cardiovascular:  RRR S1S2 heard, no murmurs or gallops  Abdomen:  Soft, obese,undistended, non tender, no organomegaly, BS present  Sacral ulcer not examined   Extremities: large superficial ulcer to right leg with black discoloration , improving cellulitis  No edema or cyanosis.  Distal pulses well felt  Neurological : grossly normal with intermittent confusion         Medications:  Scheduled Meds:   vancomycin  750 mg Intravenous Once    epoetin daryn-epbx  5,000 Units Intravenous Once per day on Tue Thu Sat    metoprolol tartrate  12.5 mg Oral BID    atorvastatin  20 mg Oral Nightly    heparin (porcine)  4,500 Units Intravenous Once per day on Tue Thu Sat    famotidine (PEPCID) injection  20 mg Intravenous Daily    midodrine  10 mg Oral TID WC    hydrocortisone sodium succinate PF  50 mg Intravenous Q6H    meropenem  500 mg Intravenous Q24H    vancomycin (VANCOCIN) intermittent dosing (placeholder)   Other RX Placeholder    collagenase   Topical Daily    lidocaine 1 % injection  5 mL Intradermal Once    warfarin (COUMADIN) daily dosing (placeholder)   Other RX Placeholder    sodium chloride flush  10 mL Intravenous 2 times per day    albuterol sulfate HFA  4 puff Inhalation Q4H    ipratropium  4 puff Inhalation Q4H    chlorhexidine  15 mL Mouth/Throat BID    insulin lispro  0-6 Units Subcutaneous 6 times per day       PRN Meds:  vancomycin, vancomycin, ipratropium-albuterol, morphine, albumin human, heparin (porcine), vancomycin, phenol, LORazepam, sodium chloride flush, glucose, dextrose, glucagon (rDNA), dextrose    Results:  CBC:   Recent Labs     06/11/19  0600 06/12/19  0510 06/13/19  0605   WBC 14.0* 13.1* 13.4* HGB 9.1* 8.9* 9.3*   HCT 31.1* 29.9* 31.2*   MCV 92.0 88.0 89.4    178 207     BMP:   Recent Labs     06/11/19  0600 06/12/19  0510 06/13/19  0709   * 127* 128*   K 4.7 4.5 5.0   CL 95* 91* 89*   CO2 22 24 23   PHOS 4.1 3.7 4.5   BUN 58* 33* 47*   CREATININE 3.3* 2.6* 3.5*     PT/INR:   Recent Labs     06/11/19  0600 06/12/19  0519 06/13/19  0605   PROTIME 18.4* 17.7* 17.0*   INR 1.61* 1.55* 1.49*       Cultures:  Respiratory cultures growing pseudomonas and staph aureus-MSSA    Films:  XR CHEST PORTABLE   Final Result   Stable support apparatus. Mild bibasilar airspace disease, atelectasis versus pneumonia. Persistent cardiomegaly. XR ABDOMEN FOR NG/OG/NE TUBE PLACEMENT   Final Result   Feeding tube tip in region of gastric antrum         IR PICC WO SQ PORT/PUMP > 5 YEARS   Final Result   Successful placement of right internal jugular central line. XR CHEST PORTABLE   Final Result   Right IJ CVC placement without radiographic complication         XR CHEST PORTABLE   Final Result   Medial right basal opacities could reflect atelectasis, aspiration and/or   pneumonia. Stable life support device positioning. Unchanged cardiomegaly with central vascular congestion. Small effusions   suspected. XR CHEST PORTABLE   Final Result   Retrocardiac opacities favored to represent bronchovascular crowding and   atelectasis. Difficult to entirely exclude superimposed aspiration or   pneumonia. Stable cardiomegaly with central vascular congestion. Equivocal for small   effusions. XR CHEST PORTABLE    (Results Pending)      Cultures  Blood- NGTD  Sputum - pseudomonas and Acinetobacter       Assessment and Plan        #  Septic shock. Sec to pneumonia . Persistently hypotensive, requiring  Levophed and Vasopressin. He was fluid resuscitated. His BP is improved. Weaned off pressors.  -Continue Stress dose of steroids- Hydrocortisone.  Can wean     #  Acute

## 2019-06-13 NOTE — PLAN OF CARE
Pt pulled his central line out last night with no apparent injury sustained. Peripheral IV started in R hand. Pt refused HS meds. Pt is tolerating being off Vent during the day and being on the [facility] valve to speak and eat.

## 2019-06-13 NOTE — PROGRESS NOTES
06/12/19 2244   Vent Information   Vent Type 840   Vent Mode AC/VC   Vt Ordered 500 mL   Rate Set 18 bmp   Peak Flow 60 L/min   Pressure Support 0 cmH20   FiO2  30 %   Sensitivity 2   PEEP/CPAP 5   Humidification Source Heated wire   Humidification Temp 36.8   Humidification Temp Measured 36.8   Circuit Condensation Drained   Vent Patient Data   Peak Inspiratory Pressure 38 cmH2O   Mean Airway Pressure 14 cmH20   Rate Measured 22 br/min   Vt Exhaled 852 mL   Minute Volume 11.1 Liters   I:E Ratio 1:2.40   Cough/Sputum   Sputum How Obtained Tracheal;Suctioned   Cough Productive   Sputum Amount Moderate   Sputum Color Creamy; Red   Tenacity Thick   Spontaneous Breathing Trial (SBT) RT Doc   Pulse 104   SpO2 95 %   Breath Sounds   Right Upper Lobe Diminished   Right Middle Lobe Diminished   Right Lower Lobe Diminished   Left Upper Lobe Diminished   Left Lower Lobe Diminished   Additional Respiratory  Assessments   Resp 22   Position Semi-Gtz's   Alarm Settings   High Pressure Alarm 45 cmH2O   Low Minute Volume Alarm 3 L/min   Apnea (secs) 20 secs   High Respiratory Rate 45 br/min   Patient Observation   Observations #6 EVA PETERST PROX. AMBU BAG AT HEAD OF BED. WATER BAG GOOD.

## 2019-06-13 NOTE — CONSULTS
[address]                 [address] [place] [address]                                  CONSULTATION    PATIENT NAME: Blake Mckeon                         :        1960  MED REC NO:   [id number]                          ROOM:       1732  ACCOUNT NO:   [de-identified]                           ADMIT DATE: 2019  PROVIDER:     Cody Fiore MD    CONSULT DATE:  2019    INFECTIOUS DISEASE CONSULTATION    ATTENDING PROVIDER:  Quynh Thorne MD    LOCATION:  Room #3002. HISTORY OF PRESENT ILLNESS:  The patient is a 63-year-old morbidly obese  male with end-stage renal disease and a tracheostomy, requiring  intermittent mechanical ventilation, whom I have been asked to see  regarding antibiotic management. The patient was admitted to the  hospital on approximately 2019 with altered mental status and  vomiting and ultimately, septic shock. The patient required pressor  therapy but is now off of that. The patient was transferred to [facility]  about a week ago from the [facility].   He has apparently been nonambulatory for 6 months. His early course  here was complicated by his lactic acidosis. The patient has numerous  decubitus ulcers which are mostly stage II. The patient has also  developed a hemorrhagic ulcerative area over his right anterior tibial  region. The patient has been mostly afebrile recently. He has a mild  leukocytosis. The nurses report that he has been doing better from a  respiratory standpoint and is currently on a trach collar. The  patient's chief complaint is end-stage renal disease and waxing and  waning respiratory failure. PAST MEDICAL HISTORY:  The patient is status post cardiac arrest, which  I gather occurred at the [address].  The patient has  apparently had end-stage renal disease on hemodialysis at least for last  several months.   He has a

## 2019-06-13 NOTE — PROGRESS NOTES
mention of complication, not stated as uncontrolled      Past Surgical History:   Procedure Laterality Date    CORONARY ANGIOPLASTY WITH STENT PLACEMENT      stents x 2     KNEE SURGERY         Objective:   /78   Pulse 118   Temp 98 °F (36.7 °C) (Oral)   Resp 25   Ht 5' 9\" (1.753 m)   Wt (!) 369 lb 4.3 oz (167.5 kg)   SpO2 99%   BMI 54.53 kg/m²       Intake/Output Summary (Last 24 hours) at 6/13/2019 0942  Last data filed at 6/12/2019 1457  Gross per 24 hour   Intake 271 ml   Output 0 ml   Net 271 ml       TELEMETRY:  afib controlled ventricular rate. Physical Exam:  General: No Respiratory distress, appears well developed and well nourished. Eyes:  Sclera nonicteric  Nose/Sinuses:  negative findings: nose shows no deformity, asymmetry, or inflammation, nasal mucosa normal, septum midline with no perforation or bleeding  Back:  no pain to palpation  Joint:  no active joint inflammation  Musculoskeletal:  negative  Skin:  Warm and dry  Neck:  Negative for JVD and Carotid Bruits. Chest:  Clear to auscultation, respiration easy  Cardiovascular:  irreg irreg S2 normal, no murmur, no rub or thrill.   Abdomen:  Soft normal liver and spleen  Extremities:   No edema, clubbing, cyanosis,Rt leg has a wound   Neuro: confused    Medications:    vancomycin  750 mg Intravenous Once    atorvastatin  20 mg Oral Nightly    metoprolol tartrate  12.5 mg Oral BID    epoetin daryn-epbx  5,000 Units Intravenous Once per day on Tue Thu Sat    heparin (porcine)  4,500 Units Intravenous Once per day on Tue Thu Sat    famotidine (PEPCID) injection  20 mg Intravenous Daily    midodrine  10 mg Oral TID WC    hydrocortisone sodium succinate PF  50 mg Intravenous Q6H    meropenem  500 mg Intravenous Q24H    vancomycin (VANCOCIN) intermittent dosing (placeholder)   Other RX Placeholder    collagenase   Topical Daily    lidocaine 1 % injection  5 mL Intradermal Once    warfarin (COUMADIN) daily dosing (placeholder) Other RX Placeholder    sodium chloride flush  10 mL Intravenous 2 times per day    albuterol sulfate HFA  4 puff Inhalation Q4H    ipratropium  4 puff Inhalation Q4H    chlorhexidine  15 mL Mouth/Throat BID    insulin lispro  0-6 Units Subcutaneous 6 times per day      norepinephrine Stopped (19 0800)    dextrose       vancomycin, vancomycin, ipratropium-albuterol, morphine, albumin human, heparin (porcine), vancomycin, phenol, LORazepam, sodium chloride flush, glucose, dextrose, glucagon (rDNA), dextrose    Lab Data:  CBC:   Recent Labs     19  0600 19  0510 19  0605   WBC 14.0* 13.1* 13.4*   HGB 9.1* 8.9* 9.3*   HCT 31.1* 29.9* 31.2*   MCV 92.0 88.0 89.4    178 207     BMP:   Recent Labs     19  0600 19  0510 19  0709   * 127* 128*   K 4.7 4.5 5.0   CL 95* 91* 89*   CO2 22 24 23   PHOS 4.1 3.7 4.5   BUN 58* 33* 47*   CREATININE 3.3* 2.6* 3.5*     LIVER PROFILE: No results for input(s): AST, ALT, LIPASE, BILIDIR, BILITOT, ALKPHOS in the last 72 hours. Invalid input(s): AMYLASE,  ALB  PT/INR:   Recent Labs     19  0600 19  0519 19  0605   PROTIME 18.4* 17.7* 17.0*   INR 1.61* 1.55* 1.49*     APTT: No results for input(s): APTT in the last 72 hours. BNP:  No results for input(s): BNP in the last 72 hours.   IMAGING:      I have reviewed the following tests and documented in this encounter as follows:     EK19 I have reviewed EKG with the following interpretation:  Impression:  Atrial fibrillation with frequent Premature ventricular complexes and Fusion complexesLow voltage QRSST abnormality inferolateral leads consider ischemia vs digoxin effectAbnormal ECGWhen compared with ECG of 2019 08:15,QRS duration decreasedConfirmed by Falguni Steve MD     CXR 6/10/19  Stable support apparatus.   Mild bibasilar airspace disease, atelectasis versus pneumonia.   Persistent cardiomegaly.     FINDINGS: Right internal jugular dialysis catheter and central venous catheter are again seen.  Tracheostomy tube is again seen with tip in the midthoracic trachea. The lung volumes are low.  There is mild bibasilar airspace disease. No pneumothorax or definite effusion.  Cardiac silhouette is enlarged, similar in appearance.  No acute osseous abnormality.      EKG 6/3/19  Atrial fibrillationRight bundle branch blockNonspecific ST abnormalityFusion complexesAbnormal ECGWhen compared with ECG of 03-JUN-2019 05:31, (unconfirmed)HR decreasedConfirmed by JARAD Major MD [id number]) on 6/3/2019 5:33:32 PM     ECHO 12/6/18  EF 50-55% normal global wall motion     ECHO 3/21/17  Summary   This is a suboptimal study. Definity was administered.   Left ventricular size is mildly increased. Mild concentric left ventricular hypertrophy is present. Ejection fraction is visually estimated to be 55-60%. No regional wall motion abnormalities are noted. Patient appears to be in  Alise fibrillation. Normal right ventricular size and function. Trivial pulmonic regurgitation.     [facility] 3/18/17  ANGIOGRAPHIC FINDINGS. 1.  The left main bifurcates into a left anterior descending, left circumflex  artery.  The left main was angiographically free of disease. 2.  The left anterior descending follows a normal anatomic course. Annice Rosin is a  long segment of disease in the [id number] segment of 99% stenosis.  In  the mid LAD, there is another more focal stenosis of approximately 90%. 3.  The left circumflex artery is a codominant vessel.  The left circumflex  and the obtuse marginals have diffuse luminal irregularities but no  hemodynamically obstructive defects. 4.  The right coronary artery is a codominant vessel also supplying the PDA.    There is mild diffuse disease through the right coronary artery with an  approximate 50% stenosis in the proximal RCA.       CONCLUSIONS:  1.  Two-vessel coronary artery disease with a 99% stenosis to the proximal and  mid LAD and another 90% in

## 2019-06-14 NOTE — PROGRESS NOTES
Pulmonary & Critical Care Medicine ICU Progress Note    CC: Acute respiratory failure and shock    Events of Last 24 hours:   Patient took out his own IV and was briefly refusing to have a new IV placed  Patient refusing turns  Desaturations overnight requiring increased FiO2  Hypotension overnight requiring fluid bolus and additional dose Midodrine    Invasive Lines: peripheral    MV: chronic  Vent Mode: AC/VC Rate Set: 18 bmp/Vt Ordered: 500 mL/ /FiO2 : 45 %  No results for input(s): PHART, NMP8MVJ, PO2ART in the last 72 hours. IV:   norepinephrine Stopped (19 0800)    dextrose         Vitals:  Blood pressure (!) 88/49, pulse 100, temperature 97.4 °F (36.3 °C), temperature source Oral, resp. rate 19, height 5' 9\" (1.753 m), weight (!) 353 lb 9.9 oz (160.4 kg), SpO2 98 %. on 45%, titrated back down to 35%  Temp  Av.9 °F (36.6 °C)  Min: 97.4 °F (36.3 °C)  Max: 98.7 °F (37.1 °C)    Intake/Output Summary (Last 24 hours) at 2019 0615  Last data filed at 2019 1801  Gross per 24 hour   Intake 1460 ml   Output 3700 ml   Net -2240 ml     EXAM:  General:  NAD  Eyes: PERRL. No sclera icterus. No conjunctival injection. ENT: No discharge. Pharynx clear  Neck: Trachea midline with tracheostomy tube present, #6 Shiley. Normal thyroid. Resp: No accessory muscle use. No crackles. No wheezing. No rhonchi. No dullness on percussion. CV: Regular rate. Regular rhythm. No mumur or rub.  + edema. GI: Obese. Non-tender. Non-distended. No masses. No organomegaly. Normal bowel sounds. No hernia. Skin: Warm and dry. No nodule on exposed extremities. RLE wound approximately 6X 10 cm, two open areas, erythematous border appears slightly improved  Lymph: No cervical LAD. No supraclavicular LAD. M/S: No cyanosis. No joint deformity. No clubbing. Neuro: awake and communicative, speech relatively clear with PMV.  Patellar reflexes are symmetric    Scheduled Meds:   atorvastatin  20 mg Oral Nightly    wound  · Right lower extremity pretibial wound  · CAD S/P RAMANA to LAD, now with elevated troponin, favor demand ischemia    PLAN:  · Mechanical ventilation as per my orders. · CATC as tolerated during day, full support HS  · Merrem and vancomycin day #5, received 8 days cefepime. Consider adding colistin today given clinical deterioration, per infectious disease recommendations. Also will ask Dr. Alonzo if we should pursue additional imaging of the RLE for osteomyelitis given presence of IO catheter previously at this site    · IHD per nephrology  · Medical management of angina per cardiology 6/13/19  · Coumadin, INR monitoring for goal 2-3  · Hydrocortisone, taper to 50 twice a day, will need evaluation for adrenal insufficiency prior to stopping steroids  · Midodrine    · Hospice has seen, currently we are continuing life prolonging measures & life support. Patient had multiple questions today about the consequences of stopping dialysis as well as his prognosis for recovery.   · Prophylaxis: On Coumadin, pepcid   · PCU today

## 2019-06-14 NOTE — PROGRESS NOTES
Internal Medicine ICU Progress Note      Interval history:   61year old white male with history of CAD, COPD, ESRD on HD for the last four months, chronic tracheostomy for five months - has been at [facility], recently discharged to [facility], one week ago. Has decubitus ulcer to sacrum    Admitted now with septic shock, pneumonia   He was weaning off vent and was on trach collar for up to 12 hours a day, in the nursing home. Now back on vent support. Started on CRRT for renal failure. Required pressors vasopressin and levophed    Patient is clearly indicated that he wants his code status to be DNR CCA. He is awake and alert, responding appropriately. Mentation improved. Awake and alert. Weaned off pressors now   Weaned off vent during day to trach collar now     Patient and family interested in discussing palliative care/ hospice options  - hospice consulted. They have not made any decisions yet. Continue current management       Invasive Lines: CVC  placed on 19        MV:  On ventilator qhs . Has tracheostomy    No results for input(s): PHART, UJA0BHB, PO2ART in the last 72 hours. MV Settings:  Vent Mode: AC/VC Rate Set: 18 bmp/Vt Ordered: 500 mL/ /FiO2 : 35 %    IV:   dextrose         Vitals:  Temp  Av.7 °F (36.5 °C)  Min: 97.4 °F (36.3 °C)  Max: 97.8 °F (36.6 °C)  Pulse  Av.1  Min: 91  Max: 126  BP  Min: 62/53  Max: 128/98  SpO2  Av %  Min: 84 %  Max: 100 %  FiO2   Av %  Min: 30 %  Max: 45 %  Patient Vitals for the past 4 hrs:   BP Pulse Resp SpO2   19 1100 (!) 97/47 104 15 --   19 1053 -- -- 14 99 %       CVP:        Intake/Output Summary (Last 24 hours) at 2019 1432  Last data filed at 2019 1801  Gross per 24 hour   Intake 1040 ml   Output 3700 ml   Net -2660 ml       EXAM:  General:obese,middle aged male up in bed    Awake, alert and oriented.  Appears to be not in any distress  Mucous Membranes:  Pink , anicteric  Tracheostomy status  With PMV +

## 2019-06-14 NOTE — PROGRESS NOTES
Pt medicated with Ativan prior to dressing changes. Pt took out his IV and has refused for the last hour to have IV restarted. Pt finally agreeable in order to get medicatio prior to dressing changes on his back.

## 2019-06-14 NOTE — PROGRESS NOTES
Pt. Educated on risk of not turning such as increased risk of pressure ulcer formation pt. Still refused to turn.

## 2019-06-14 NOTE — CARE COORDINATION
Palliative Care Note    Palliative Care Admit Date: 06/11/2019    Plan of care/goals: Chart reviewed. Noted increased FiO2 requirements, hypotension overnight. Patient and his family met with [facility]: declined hospice service, agreeable to maintain contact with [address] [place] [address] for ongoing questions, support. PC RN will follow.

## 2019-06-15 NOTE — PROGRESS NOTES
Pt off ventilator and placed on 35% CATC with Spo2 95% for pt to go to dialysis. PMV in place with cuff deflated. Pt suctioned for moderate amount of creamy thick secretions.

## 2019-06-15 NOTE — PROGRESS NOTES
Hospitalist Progress Note      PCP: [facility] Moran MD    Date of Admission: 6/3/2019    Subjective: still contemplating hospice vs dialysis, pt appears mildly confused    Medications:  Reviewed    Infusion Medications    dextrose       Scheduled Medications    vancomycin  750 mg Intravenous Once    warfarin  3 mg Oral Once    epoetin daryn-epbx  3,000 Units Intravenous Once per day on Tue Thu Sat    IVPB builder   Intravenous Q24H    [START ON 6/16/2019] IVPB builder   Intravenous Once per day on Sun Mon Wed Fri    IVPB builder   Intravenous Once per day on Tue Thu Sat    atorvastatin  20 mg Oral Nightly    metoprolol tartrate  12.5 mg Oral BID    hydrocortisone sodium succinate PF  50 mg Intravenous Q12H    meropenem (MERREM) 1 g IVPB (mini-bag)  1 g Intravenous Q24H    heparin (porcine)  4,500 Units Intravenous Once per day on Tue Thu Sat    famotidine (PEPCID) injection  20 mg Intravenous Daily    midodrine  10 mg Oral TID WC    vancomycin (VANCOCIN) intermittent dosing (placeholder)   Other RX Placeholder    collagenase   Topical Daily    warfarin (COUMADIN) daily dosing (placeholder)   Other RX Placeholder    sodium chloride flush  10 mL Intravenous 2 times per day    albuterol sulfate HFA  4 puff Inhalation Q4H    ipratropium  4 puff Inhalation Q4H    chlorhexidine  15 mL Mouth/Throat BID    insulin lispro  0-6 Units Subcutaneous 6 times per day     PRN Meds: oxyCODONE, vancomycin, vancomycin, ipratropium-albuterol, albumin human, heparin (porcine), vancomycin, phenol, LORazepam, sodium chloride flush, glucose, dextrose, glucagon (rDNA), dextrose      Intake/Output Summary (Last 24 hours) at 6/15/2019 1625  Last data filed at 6/15/2019 1548  Gross per 24 hour   Intake 330 ml   Output 0 ml   Net 330 ml       Physical Exam Performed:    BP (!) 109/57   Pulse 111   Temp 96.7 °F (35.9 °C) (Oral)   Resp 18   Ht 5' 9\" (1.753 m)   Wt (!) 358 lb 1.6 oz (162.4 kg)   SpO2 97%   BMI PORTABLE   Final Result   Right IJ CVC placement without radiographic complication         XR CHEST PORTABLE   Final Result   Medial right basal opacities could reflect atelectasis, aspiration and/or   pneumonia. Stable life support device positioning. Unchanged cardiomegaly with central vascular congestion. Small effusions   suspected. XR CHEST PORTABLE   Final Result   Retrocardiac opacities favored to represent bronchovascular crowding and   atelectasis. Difficult to entirely exclude superimposed aspiration or   pneumonia. Stable cardiomegaly with central vascular congestion. Equivocal for small   effusions. Assessment/Plan:    Active Hospital Problems    Diagnosis    Coronary artery disease involving native coronary artery of native heart without angina pectoris [I25.10]     Priority: High    Acute respiratory failure with hypoxia ([facility]) [J96.01]    Septic shock ([facility]) [A41.9, R65.21]    Supratherapeutic INR [R79.1]    Lactic acidosis [E87.2]    Aspiration pneumonia ([address] [J69.0]    ELODIA treated with BiPAP [G47.33]    DMII (diabetes mellitus, type 2) ([address] [E11.9]    Morbid obesity with BMI of 60.0-69.9, adult ([facility]) [E66.01, Z68.44]    COPD (chronic obstructive pulmonary disease) ([address] [J44.9]    Chronic atrial fibrillation ([address] [I48.2]           #  Septic shock. Sec to pneumonia . Persistently hypotensive, requiring  Levophed and Vasopressin. He was fluid resuscitated. His BP is improved. Weaned off pressors.  -Continue Stress dose of steroids- Hydrocortisone. Can wean     #  Acute respiratory failure. Has chronic tracheostomy . Placed on the ventilator for resp distress . Has been on mechanical ventilation - > weaned off slowly . - off vent , On trach collar during day since 6/10.  Plan continue Home setting trach collar during day and vent support q hs      #  Aspiration pneumonia - cx with pseudomonas , MSSA and Acinetobacter  Placed on Vancomycin and

## 2019-06-16 NOTE — PROGRESS NOTES
Report given to EMS and patient d/c to hospice in stable condition. Family at the bedside. All personal belongings sent with patient.

## 2019-06-16 NOTE — PROGRESS NOTES
[facility]       Progress Note           Subjective/   61y.o. year old male who we are seeing in consultation for ESRD. HPI:  Plans now for hospice. Having pain issues requiring oxy. ROS:  Mentation intact, no fevers. Objective/   GEN:  Chronically ill, BP 93/69   Pulse 121   Temp 96.8 °F (36 °C) (Axillary)   Resp 18   Ht 5' 9\" (1.753 m)   Wt (!) 360 lb 1.6 oz (163.3 kg)   SpO2 100%   BMI 53.18 kg/m²   HEENT: non-icteric, no JVD  ACCESS: [facility]    Data/  Recent Labs     06/14/19  0506 06/15/19  0547 06/16/19  0504   WBC 15.9* 15.6* 18.3*   HGB 10.0* 9.9* 9.5*   HCT 33.8* 33.5* 31.9*   MCV 89.6 91.0 91.2    252 258     Recent Labs     06/14/19  0506 06/15/19  0547 06/16/19  0504   * 132* 132*   K 4.5 5.0 5.0   CL 92* 93* 91*   CO2 23 22 24   GLUCOSE 79 92 87   PHOS 3.6 5.8* 7.0*   BUN 29* 43* 52*   CREATININE 2.8* 3.9* 4.7*   LABGLOM 23* 16* 13*   GFRAA 28* 19* 15*       Assessment/Plan     ESRD.  - On HD TThS via a [facility] outpatient. Has an immature LUE AVF. - CRRT 6/4/19-6/9/19, now tolerating iHD  - No further plans for HD, going to hospice today  - Discussed in length how things would go if he stopped dialysis, life expectancy     Septic Shock.  - BP's better, off Levophed. - On IV Vancomycin and Meropenem.     Acute Hypoxic Respiratory Failure with Aspiration Pneumonia. - Plans per Pulmonary.      Anemia.  - Hgb now a goal.   - Decreased EPO to 3,000 units starting 6/15/19.     Hypotension.    - On hydrocortisone and Midodrine 10 mg TID.     Plans for hospice now  Will sign off for now  Please call with questions    Case d/w patient, family, hospice nurse

## 2019-06-16 NOTE — PROGRESS NOTES
Bedside report and Pt care transferred to [facility]. Pt denies any assistance at this time.

## 2019-06-16 NOTE — PROGRESS NOTES
06/15/19 2322   Vent Information   Vent Type 840   Vent Mode AC/VC   Vt Ordered 500 mL   Rate Set 18 bmp   Peak Flow 60 L/min   FiO2  35 %   Sensitivity 3   PEEP/CPAP 5   Humidification Source Heated wire   Humidification Temp 37   Humidification Temp Measured 37.2   Vent Patient Data   Peak Inspiratory Pressure 34 cmH2O   Mean Airway Pressure 12 cmH20   Rate Measured 25 br/min   Vt Exhaled 418 mL   Minute Volume 10 Liters   I:E Ratio 1:2.1   Cough/Sputum   Sputum How Obtained Suctioned;Tracheal   Cough Productive   Sputum Amount Small   Sputum Color Creamy   Tenacity Thick   Spontaneous Breathing Trial (SBT) RT Doc   Pulse 102   SpO2 97 %   Breath Sounds   Right Upper Lobe Diminished   Right Middle Lobe Diminished   Right Lower Lobe Diminished   Left Upper Lobe Diminished   Left Lower Lobe Diminished   Additional Respiratory  Assessments   Position Semi-Gtz's   Alarm Settings   High Pressure Alarm 45 cmH2O   Low Minute Volume Alarm 4 L/min   Apnea (secs) 20 secs   High Respiratory Rate 45 br/min   Low Exhaled Vt  300 mL   Patient Observation   Observations #6 shiley xlt prox

## 2019-07-18 NOTE — DISCHARGE SUMMARY
CARDIOLOGY  IP CONSULT TO INFECTIOUS DISEASES  IP CONSULT TO PHARMACY    Disposition:  Inpt hospice     Condition at Discharge: fair    Discharge Instructions/Follow-up:  PCP in 1 week    Code Status:  Prior     Activity: activity as tolerated    Diet: regular diet      Discharge Medications:     Discharge Medication List as of 6/16/2019  5:03 PM           Details   acetaminophen (TYLENOL) 325 MG tablet Take 650 mg by mouth every 6 hours as needed for PainHistorical Med      insulin glargine (BASAGLAR KWIKPEN) 100 UNIT/ML injection pen Inject 5 Units into the skin nightlyHistorical Med      bisacodyl (BISACODYL) 5 MG EC tablet Take 10 mg by mouth daily as needed for ConstipationHistorical Med      bisacodyl (DULCOLAX) 10 MG suppository Place 10 mg rectally daily as needed for ConstipationHistorical Med      fluticasone (FLONASE) 50 MCG/ACT nasal spray 1 spray by Each Nostril route dailyHistorical Med      gabapentin (NEURONTIN) 100 MG capsule Take 100 mg by mouth daily. Indications: Give 100mg by mouth one time a day every Tuesday, Thursday, and saturday for neuropathy. Give on HD days. Historical Med      insulin lispro (HUMALOG) 100 UNIT/ML injection vial Inject into the skin 3 times daily (before meals)Historical Med      ammonium lactate (LAC-HYDRIN) 12 % cream Apply topically as needed for Dry Skin (Apply to BLE topically one time a day for dry skin.) Apply topically as needed., Topical, PRN, Historical Med      lactulose (CEPHULAC) 20 g packet Take 20 g by mouth 3 times dailyHistorical Med      Lidocaine 4 % LOTN Apply topicallyHistorical Med      melatonin 3 MG TABS tablet Take 3 mg by mouth nightly as neededHistorical Med      montelukast (SINGULAIR) 10 MG tablet Take 10 mg by mouth nightlyHistorical Med      ondansetron (ZOFRAN) 4 MG tablet Take 4 mg by mouth every 6 hours as needed for Nausea or VomitingHistorical Med      oxyCODONE 5 MG capsule Take 10 mg by mouth every 4 hours as needed for

## 2019-09-04 NOTE — PROGRESS NOTES
At [facility], one important tool we use to improve our patient services is our Patient Survey.  Following your visit you may receive our survey in the mail.    Please take the time to complete the survey.    If your visit with us was great, we want to hear about it.    If we can improve, please let us know how.          Pt was placed back on vent due to feeling SOB.